# Patient Record
Sex: MALE | Race: WHITE | NOT HISPANIC OR LATINO | Employment: STUDENT | URBAN - METROPOLITAN AREA
[De-identification: names, ages, dates, MRNs, and addresses within clinical notes are randomized per-mention and may not be internally consistent; named-entity substitution may affect disease eponyms.]

---

## 2024-02-08 ENCOUNTER — HOSPITAL ENCOUNTER (OUTPATIENT)
Dept: RADIOLOGY | Facility: CLINIC | Age: 24
Discharge: HOME | End: 2024-02-08
Payer: COMMERCIAL

## 2024-02-08 DIAGNOSIS — R52 PAIN: ICD-10-CM

## 2024-02-08 PROCEDURE — 73610 X-RAY EXAM OF ANKLE: CPT | Mod: RT

## 2024-02-08 PROCEDURE — 73610 X-RAY EXAM OF ANKLE: CPT | Mod: RIGHT SIDE | Performed by: RADIOLOGY

## 2024-02-08 PROCEDURE — 73630 X-RAY EXAM OF FOOT: CPT | Mod: RT

## 2024-02-08 PROCEDURE — 73630 X-RAY EXAM OF FOOT: CPT | Mod: RIGHT SIDE | Performed by: RADIOLOGY

## 2024-02-09 ENCOUNTER — OFFICE VISIT (OUTPATIENT)
Dept: ORTHOPEDIC SURGERY | Facility: HOSPITAL | Age: 24
End: 2024-02-09
Payer: COMMERCIAL

## 2024-02-09 VITALS — BODY MASS INDEX: 28.35 KG/M2 | WEIGHT: 228 LBS | HEIGHT: 75 IN

## 2024-02-09 DIAGNOSIS — S82.839A CLOSED TRAUMATIC DISPLACED FRACTURE OF DISTAL END OF FIBULA: Primary | ICD-10-CM

## 2024-02-09 PROCEDURE — 99214 OFFICE O/P EST MOD 30 MIN: CPT | Performed by: PHYSICIAN ASSISTANT

## 2024-02-09 PROCEDURE — 1036F TOBACCO NON-USER: CPT | Performed by: PHYSICIAN ASSISTANT

## 2024-02-09 PROCEDURE — 99204 OFFICE O/P NEW MOD 45 MIN: CPT | Performed by: PHYSICIAN ASSISTANT

## 2024-02-09 ASSESSMENT — PAIN DESCRIPTION - DESCRIPTORS: DESCRIPTORS: DULL;SHARP

## 2024-02-09 ASSESSMENT — PAIN - FUNCTIONAL ASSESSMENT: PAIN_FUNCTIONAL_ASSESSMENT: 0-10

## 2024-02-09 ASSESSMENT — PAIN SCALES - GENERAL: PAINLEVEL_OUTOF10: 2

## 2024-02-09 NOTE — PROGRESS NOTES
"NPV-   History of Present Illness  23 y.o.male presents at same day walk in clinic for   1. Closed traumatic displaced fracture of distal end of fibula  Knee Walker      Mechanism of injury: hockey; ankle inversion  Date of Injury/Pain: 2/4/24  Location of pain: lateral ankle  Quality of pain:  6  Frequency of Pain: worse with walking  Associated symptoms? Swelling.  Previous treatment?  UC , xrays, splint    27 point review of systems negative except what is stated in HPI    On examination of the right ankle/foot:  NWB  Normal alignment  Mild swelling and ecchymosis of the ankle. No erythema. Skin intact; no ulcers or lesions.   Decreased ROM in  ankle plantarflexion, dorsiflexion, inversion and eversion; normal ROM in 2-5th toe flexion/extension and 1st MTP flexion/extension  Normal strength in ankle plantarflexion, dorsiflexion, inversion and eversion; normal strength with great toe flexion/extension  Tenderness to palpation: distal fibula  No tenderness to palpation over the Achilles, medial malleolus, posterior tibial tendon, peroneal tendon, ATFL, deltoid ligament, talus or navicular.  no tenderness to palpation over heel, plantar arch, base of 1st metatarsal/2nd metatarsal, sesamoids, 5th metatarsal, medial cuneiform, navicular, 1st MTP joint or 2nd or 3rd intermetarsal space.  Neurovascularly intact. Good capillary refill. No sensory deficit to light touch. Normal proprioception. Dorsalis pedis and posterior tibial pulses 2+ bilaterally.    - Rhodes's test                              -                                 - shuck testing  - Squeeze test  - \"too many toes\" sign     I personally reviewed the patient's x-ray images and reports of the right ankle/foot. The xrays show a displaced fracture of the distal fibula    ASSESSMENT: right ankle displaced distal fibula fracture    PLAN: Treatment options were discussed with the patient. Patient was placed in a well padded fiberglass cast/splint to be " nonweightbearing with crutches. They will keep the cast/splint elevated supported at the calf with NO pressure on the heel to reduce swelling. Patient will increase their dietary calcium intake (milk,yogurt) and should get 1200 mg of calcium per day. They will also take a vitamin D supplement.  All the patient's questions were answered. The patient agrees with the above plan.  Follow up with Dr. Miller

## 2024-02-09 NOTE — PATIENT INSTRUCTIONS
You were placed in a cast/splint to be nonweightbearing with your crutches. DO NOT place any weight on your cast/splint. It is important to keep your cast/splint elevated supported at the calf with NO pressure on the heel to reduce swelling.    Patient will increase their dietary calcium intake (milk,yogurt) and should get 1200 mg of calcium per day. They will also take a vitamin D supplement.    Follow up Monday with Dr. Miller

## 2024-02-12 ENCOUNTER — OFFICE VISIT (OUTPATIENT)
Dept: ORTHOPEDIC SURGERY | Facility: HOSPITAL | Age: 24
End: 2024-02-12
Payer: COMMERCIAL

## 2024-02-12 DIAGNOSIS — S82.831A TRAUMATIC CLOSED NONDISPLACED FRACTURE OF DISTAL FIBULA, RIGHT, INITIAL ENCOUNTER: Primary | ICD-10-CM

## 2024-02-12 DIAGNOSIS — M94.9 CHONDRAL LESION: ICD-10-CM

## 2024-02-12 DIAGNOSIS — S93.491A SYNDESMOTIC ANKLE SPRAIN, RIGHT, INITIAL ENCOUNTER: ICD-10-CM

## 2024-02-12 DIAGNOSIS — S82.51XA CLOSED AVULSION FRACTURE OF MEDIAL MALLEOLUS, RIGHT, INITIAL ENCOUNTER: ICD-10-CM

## 2024-02-12 PROCEDURE — 1036F TOBACCO NON-USER: CPT | Performed by: ORTHOPAEDIC SURGERY

## 2024-02-12 PROCEDURE — 99214 OFFICE O/P EST MOD 30 MIN: CPT | Performed by: ORTHOPAEDIC SURGERY

## 2024-02-12 ASSESSMENT — PAIN - FUNCTIONAL ASSESSMENT: PAIN_FUNCTIONAL_ASSESSMENT: 0-10

## 2024-02-12 ASSESSMENT — PAIN SCALES - GENERAL: PAINLEVEL_OUTOF10: 0 - NO PAIN

## 2024-02-12 NOTE — LETTER
February 12, 2024     Patient: Bon Mendoza   YOB: 2000   Date of Visit: 2/12/2024       To Whom it May Concern:    Bon Mendoza was seen in my clinic on 2/12/2024.   Dear [facility] Disability Services:  Due to Bon Mendoza's medical condition, he will require accommodations.  ICD 10 code S82.831A.  Diagnosed with physical exam and radiological studies per his healthcare team.    Accommodations:  1. Off of school from 2/27/24 to 3/6/24.  Please assist in making up his classwork and no tests or exams until after 3/16/24.  2. Requires transportation to and from classes from 2/12/24-6/15/24.  3. Requires extra time getting to and from classes from 2/12/24-6/15/24.  4. Require aisle seating if possible during classes and ability to sit during classes from 2/12/24-5/12/24.    Thank you,  Doreen Miller MD  Head Team Physician, [facility]   of Orthopedic Surgery, [facility] [employer]  [facility], [facility] [place]  [address] [place] [address], [place] [address]  [email or web address]  Office [phone]    If you have any questions or concerns, please don't hesitate to call.        Sincerely,       Doreen Miller MD    CC: No Recipients

## 2024-02-13 NOTE — PROGRESS NOTES
HISTORY OF PRESENT ILLNESS  This is a pleasant 23 y.o. year old male  who presents on 02/12/2024 for evaluation of  right ankle  pain that has been present over/since  last week .    How the condition occurred or started: He was playing hockey and was hit in center ice and hit from behind, right ankle twisted severely  Location of pain (patient points to): anterolateral lower leg and lateral ankle and less so medial ankle  Quality of pain: Moderate  Modifying Factors: not able to walk on after injury  Associated Signs and symptoms: swelling, no N/T, no other areas of injury  Previous Treatment: splinted and referred here    PHYSICAL EXAMINATION  Constitutional Exam: patient's height and weight reviewed, well-kempt  Psychiatric Exam: alert and oriented x 3, appropriate mood and behavior  Eye Exam: RAJIV, EOMI  Pulmonary Exam: breathing non-labored, no apparent distress  Lymphatic exam: no appreciable lymphadenopathy in the lower extremities  Cardiovascular exam: DP pulses 2+ bilaterally, PT 2+ bilaterally, toes are pink with good capillary refill, no pitting edema  Skin exam: no open lesions, rashes, abrasions or ulcerations  Neurological exam: sensation to light touch intact in both lower extremities in peripheral and dermatomal distributions (except for any abnormalities noted in musculoskeletal exam)    Musculoskeletal exam: right ankle: pain over distal fibula on palpation, pain over anterior deltoid area, no pain over proximal fibula, wiggles toes, no knee or hip pain, anterolateral joint line pain, effusion ankle    DATA/RESULTS REVIEW: I personally reviewed the patient's x-ray images and reports of the  right ankle shows displaced distal fibular fracture (over 3mm on lateral view seen shortening, AP view does not show displacement), subtle medial mortise widening .     ASSESSMENT: right distal fibular ramos B fracture displaced with possible chondral lesion and syndesmotic involvement, small medial malleolar  avulsion fracture   PLAN: I discussed with the patient the differential diagnosis, complex traumatic related nature of the condition(s) and available treatment option(s).  Discussed with the patient his x-rays and findings.  This type of injury does have chondral injury associated.  Also the syndesmotic ligaments may be involved due to his areas of tenderness.  Due to the displacement on lateral view which is 3 mm with shortening and malrotation with mild medial mortise widening, we would recommend surgical intervention to anatomically realign the ankle to limit risks of posttraumatic arthritis of the ankle.  Surgery would involve ankle arthroscopy of the right ankle with extensive debridement with possible microfracture if chondral full-thickness damage noted followed by open reduction internal fixation of the distal fibular fracture with possible open reduction internal fixation of the syndesmosis.  More accurate assessment of the syndesmosis can be done arthroscopically and also with stress testing with fluoroscopy.  The tiny medial malleolar avulsion injury typically does not require any fixation.  Discussed with the patient postoperative course requiring nonweightbearing in splint followed by cast followed by boot until about the 4-6-week point.  He would then start physical therapy around the third week once he gets into a boot to work on range of motion and progress through the protocol.  Early recovery is approximately 3 months and return to sports once full motion and strength and balance achieved.  Discussed with him the risks of surgery including pain, bleeding, infection, ankle stiffness, loss of ankle motion, posttraumatic arthritis, wound healing problems, bone healing problems, hardware related complications, injury nerves or vessels and other medical complications.  Discussed with him recommendation for knee scooter, shower chair versus transfer bench, cast cover and other appliances to help with his  postop recovery.  He also is a case lauded and so we wrote him a letter for disability services.      I discussed with patient the procedure in detail, risks and benefits of procedure, post-op protocol, anesthetic used with possible regional block, timeline of recovery, need for protective weightbearing and/or bracing after procedure, pain management protocol, DVT prophylaxis protocol, home preparation suggestions, pre-op surgery preparation, and other pertinent information.  The patient's questions were answered in detail and will talk with my office with respect to scheduling surgery.      Note dictated with Azubu software, completed without full type editing to avoid delay.

## 2024-02-23 ENCOUNTER — PREP FOR PROCEDURE (OUTPATIENT)
Dept: ORTHOPEDIC SURGERY | Facility: CLINIC | Age: 24
End: 2024-02-23
Payer: COMMERCIAL

## 2024-02-23 DIAGNOSIS — M94.9 CHONDRAL LESION: ICD-10-CM

## 2024-02-23 DIAGNOSIS — S93.491A SYNDESMOTIC ANKLE SPRAIN, RIGHT, INITIAL ENCOUNTER: ICD-10-CM

## 2024-02-23 DIAGNOSIS — S82.831A TRAUMATIC CLOSED NONDISPLACED FRACTURE OF DISTAL FIBULA, RIGHT, INITIAL ENCOUNTER: ICD-10-CM

## 2024-02-23 DIAGNOSIS — S82.51XA AVULSION FRACTURE OF MEDIAL MALLEOLUS, RIGHT, CLOSED, INITIAL ENCOUNTER: ICD-10-CM

## 2024-03-06 ENCOUNTER — TELEPHONE (OUTPATIENT)
Dept: ORTHOPEDIC SURGERY | Facility: CLINIC | Age: 24
End: 2024-03-06
Payer: COMMERCIAL

## 2024-03-06 ENCOUNTER — PREP FOR PROCEDURE (OUTPATIENT)
Dept: ORTHOPEDIC SURGERY | Facility: HOSPITAL | Age: 24
End: 2024-03-06
Payer: COMMERCIAL

## 2024-03-06 RX ORDER — SODIUM CHLORIDE 9 MG/ML
100 INJECTION, SOLUTION INTRAVENOUS CONTINUOUS
Status: CANCELLED | OUTPATIENT
Start: 2024-03-06

## 2024-03-06 RX ORDER — CEFAZOLIN SODIUM 2 G/100ML
2 INJECTION, SOLUTION INTRAVENOUS ONCE
Status: CANCELLED | OUTPATIENT
Start: 2024-03-06 | End: 2024-03-06

## 2024-03-06 NOTE — TELEPHONE ENCOUNTER
Patient calling because he was scheduled for ankle surgery with Dr. Miller for tomorrow however was advised by [facility] that his surgery needs to be rescheduled at least three weeks out due to patient having the flu this week.    Patient is asking for a call back to discuss new surgery dates and to answer a few questions related to medical pre-clearance since he's now had the flu, can be reached at phone # [phone].

## 2024-03-19 ENCOUNTER — HOSPITAL ENCOUNTER (OUTPATIENT)
Dept: RADIOLOGY | Facility: CLINIC | Age: 24
Setting detail: OUTPATIENT SURGERY
Discharge: HOME | End: 2024-03-19
Payer: COMMERCIAL

## 2024-03-19 ENCOUNTER — HOSPITAL ENCOUNTER (OUTPATIENT)
Facility: CLINIC | Age: 24
Setting detail: OUTPATIENT SURGERY
Discharge: HOME | End: 2024-03-19
Attending: ORTHOPAEDIC SURGERY | Admitting: ORTHOPAEDIC SURGERY
Payer: COMMERCIAL

## 2024-03-19 ENCOUNTER — ANESTHESIA EVENT (OUTPATIENT)
Dept: OPERATING ROOM | Facility: CLINIC | Age: 24
End: 2024-03-19
Payer: COMMERCIAL

## 2024-03-19 ENCOUNTER — ANESTHESIA (OUTPATIENT)
Dept: OPERATING ROOM | Facility: CLINIC | Age: 24
End: 2024-03-19
Payer: COMMERCIAL

## 2024-03-19 ENCOUNTER — PHARMACY VISIT (OUTPATIENT)
Dept: PHARMACY | Facility: CLINIC | Age: 24
End: 2024-03-19
Payer: COMMERCIAL

## 2024-03-19 VITALS
HEART RATE: 91 BPM | TEMPERATURE: 96.8 F | SYSTOLIC BLOOD PRESSURE: 126 MMHG | RESPIRATION RATE: 16 BRPM | OXYGEN SATURATION: 100 % | BODY MASS INDEX: 27.84 KG/M2 | WEIGHT: 228.62 LBS | HEIGHT: 76 IN | DIASTOLIC BLOOD PRESSURE: 75 MMHG

## 2024-03-19 DIAGNOSIS — S82.51XA AVULSION FRACTURE OF MEDIAL MALLEOLUS, RIGHT, CLOSED, INITIAL ENCOUNTER: ICD-10-CM

## 2024-03-19 DIAGNOSIS — S93.491A SYNDESMOTIC ANKLE SPRAIN, RIGHT, INITIAL ENCOUNTER: Primary | ICD-10-CM

## 2024-03-19 DIAGNOSIS — S82.831A TRAUMATIC CLOSED NONDISPLACED FRACTURE OF DISTAL FIBULA, RIGHT, INITIAL ENCOUNTER: ICD-10-CM

## 2024-03-19 DIAGNOSIS — M94.9 CHONDRAL LESION: ICD-10-CM

## 2024-03-19 PROCEDURE — A4217 STERILE WATER/SALINE, 500 ML: HCPCS | Performed by: ORTHOPAEDIC SURGERY

## 2024-03-19 PROCEDURE — 64445 NJX AA&/STRD SCIATIC NRV IMG: CPT | Performed by: STUDENT IN AN ORGANIZED HEALTH CARE EDUCATION/TRAINING PROGRAM

## 2024-03-19 PROCEDURE — 27792 TREATMENT OF ANKLE FRACTURE: CPT | Performed by: ORTHOPAEDIC SURGERY

## 2024-03-19 PROCEDURE — 2500000004 HC RX 250 GENERAL PHARMACY W/ HCPCS (ALT 636 FOR OP/ED)

## 2024-03-19 PROCEDURE — 2720000007 HC OR 272 NO HCPCS: Performed by: ORTHOPAEDIC SURGERY

## 2024-03-19 PROCEDURE — 3700000001 HC GENERAL ANESTHESIA TIME - INITIAL BASE CHARGE: Performed by: ORTHOPAEDIC SURGERY

## 2024-03-19 PROCEDURE — 29898 ANKLE ARTHROSCOPY/SURGERY: CPT | Performed by: ORTHOPAEDIC SURGERY

## 2024-03-19 PROCEDURE — 3600000002 HC OR TIME - INITIAL BASE CHARGE - PROCEDURE LEVEL TWO: Performed by: ORTHOPAEDIC SURGERY

## 2024-03-19 PROCEDURE — 3700000002 HC GENERAL ANESTHESIA TIME - EACH INCREMENTAL 1 MINUTE: Performed by: ORTHOPAEDIC SURGERY

## 2024-03-19 PROCEDURE — 7100000009 HC PHASE TWO TIME - INITIAL BASE CHARGE: Performed by: ORTHOPAEDIC SURGERY

## 2024-03-19 PROCEDURE — A29898 PR ANKLE SCOPE,EXTENS DEBRIDEMNT: Performed by: ANESTHESIOLOGY

## 2024-03-19 PROCEDURE — 2500000001 HC RX 250 WO HCPCS SELF ADMINISTERED DRUGS (ALT 637 FOR MEDICARE OP): Performed by: STUDENT IN AN ORGANIZED HEALTH CARE EDUCATION/TRAINING PROGRAM

## 2024-03-19 PROCEDURE — 2780000003 HC OR 278 NO HCPCS: Performed by: ORTHOPAEDIC SURGERY

## 2024-03-19 PROCEDURE — 2500000004 HC RX 250 GENERAL PHARMACY W/ HCPCS (ALT 636 FOR OP/ED): Performed by: STUDENT IN AN ORGANIZED HEALTH CARE EDUCATION/TRAINING PROGRAM

## 2024-03-19 PROCEDURE — 2500000004 HC RX 250 GENERAL PHARMACY W/ HCPCS (ALT 636 FOR OP/ED): Performed by: ORTHOPAEDIC SURGERY

## 2024-03-19 PROCEDURE — A29898 PR ANKLE SCOPE,EXTENS DEBRIDEMNT

## 2024-03-19 PROCEDURE — 7100000010 HC PHASE TWO TIME - EACH INCREMENTAL 1 MINUTE: Performed by: ORTHOPAEDIC SURGERY

## 2024-03-19 PROCEDURE — 2500000005 HC RX 250 GENERAL PHARMACY W/O HCPCS

## 2024-03-19 PROCEDURE — C1713 ANCHOR/SCREW BN/BN,TIS/BN: HCPCS | Performed by: ORTHOPAEDIC SURGERY

## 2024-03-19 PROCEDURE — 3600000007 HC OR TIME - EACH INCREMENTAL 1 MINUTE - PROCEDURE LEVEL TWO: Performed by: ORTHOPAEDIC SURGERY

## 2024-03-19 PROCEDURE — 7100000002 HC RECOVERY ROOM TIME - EACH INCREMENTAL 1 MINUTE: Performed by: ORTHOPAEDIC SURGERY

## 2024-03-19 PROCEDURE — 7100000001 HC RECOVERY ROOM TIME - INITIAL BASE CHARGE: Performed by: ORTHOPAEDIC SURGERY

## 2024-03-19 PROCEDURE — 27829 TREAT LOWER LEG JOINT: CPT | Performed by: ORTHOPAEDIC SURGERY

## 2024-03-19 PROCEDURE — 76000 FLUOROSCOPY <1 HR PHYS/QHP: CPT

## 2024-03-19 PROCEDURE — RXMED WILLOW AMBULATORY MEDICATION CHARGE

## 2024-03-19 DEVICE — IMPLANTABLE DEVICE: Type: IMPLANTABLE DEVICE | Site: ANKLE | Status: FUNCTIONAL

## 2024-03-19 DEVICE — K-LESS T-ROPE W/DRV, SYN REPR, SS
Type: IMPLANTABLE DEVICE | Site: ANKLE | Status: FUNCTIONAL
Brand: ARTHREX®

## 2024-03-19 DEVICE — LOCKING THIRD TUBULAR PLATE, SS, 6H
Type: IMPLANTABLE DEVICE | Site: ANKLE | Status: FUNCTIONAL
Brand: ARTHREX®

## 2024-03-19 DEVICE — LO-PRO SCRW TM,SS 3.5X 16MMCORT
Type: IMPLANTABLE DEVICE | Site: ANKLE | Status: FUNCTIONAL
Brand: ARTHREX®

## 2024-03-19 DEVICE — LO-PRO SCRW TM,SS 3.5X 22MMCORT
Type: IMPLANTABLE DEVICE | Site: ANKLE | Status: FUNCTIONAL
Brand: ARTHREX®

## 2024-03-19 RX ORDER — CELECOXIB 200 MG/1
CAPSULE ORAL AS NEEDED
Status: DISCONTINUED | OUTPATIENT
Start: 2024-03-19 | End: 2024-03-19

## 2024-03-19 RX ORDER — ASPIRIN 325 MG
325 TABLET, DELAYED RELEASE (ENTERIC COATED) ORAL DAILY
Qty: 30 TABLET | Refills: 0 | Status: SHIPPED | OUTPATIENT
Start: 2024-03-20

## 2024-03-19 RX ORDER — HYDROMORPHONE HYDROCHLORIDE 1 MG/ML
0.4 INJECTION, SOLUTION INTRAMUSCULAR; INTRAVENOUS; SUBCUTANEOUS EVERY 5 MIN PRN
Status: DISCONTINUED | OUTPATIENT
Start: 2024-03-19 | End: 2024-03-19 | Stop reason: HOSPADM

## 2024-03-19 RX ORDER — DEXAMETHASONE SODIUM PHOSPHATE 100 MG/10ML
INJECTION INTRAMUSCULAR; INTRAVENOUS AS NEEDED
Status: DISCONTINUED | OUTPATIENT
Start: 2024-03-19 | End: 2024-03-19

## 2024-03-19 RX ORDER — SODIUM CHLORIDE 0.9 G/100ML
IRRIGANT IRRIGATION AS NEEDED
Status: DISCONTINUED | OUTPATIENT
Start: 2024-03-19 | End: 2024-03-19 | Stop reason: HOSPADM

## 2024-03-19 RX ORDER — METOCLOPRAMIDE HYDROCHLORIDE 5 MG/ML
10 INJECTION INTRAMUSCULAR; INTRAVENOUS ONCE AS NEEDED
Status: DISCONTINUED | OUTPATIENT
Start: 2024-03-19 | End: 2024-03-19 | Stop reason: HOSPADM

## 2024-03-19 RX ORDER — ONDANSETRON 4 MG/1
4 TABLET, FILM COATED ORAL EVERY 8 HOURS PRN
Qty: 20 TABLET | Refills: 0 | Status: SHIPPED | OUTPATIENT
Start: 2024-03-19

## 2024-03-19 RX ORDER — LIDOCAINE IN NACL,ISO-OSMOT/PF 30 MG/3 ML
0.1 SYRINGE (ML) INJECTION ONCE
Status: DISCONTINUED | OUTPATIENT
Start: 2024-03-19 | End: 2024-03-19 | Stop reason: HOSPADM

## 2024-03-19 RX ORDER — GABAPENTIN 300 MG/1
CAPSULE ORAL AS NEEDED
Status: DISCONTINUED | OUTPATIENT
Start: 2024-03-19 | End: 2024-03-19

## 2024-03-19 RX ORDER — OXYCODONE HYDROCHLORIDE 5 MG/1
5 TABLET ORAL EVERY 4 HOURS PRN
Status: DISCONTINUED | OUTPATIENT
Start: 2024-03-19 | End: 2024-03-19 | Stop reason: HOSPADM

## 2024-03-19 RX ORDER — SODIUM CHLORIDE 9 MG/ML
100 INJECTION, SOLUTION INTRAVENOUS CONTINUOUS
Status: DISCONTINUED | OUTPATIENT
Start: 2024-03-19 | End: 2024-03-19 | Stop reason: HOSPADM

## 2024-03-19 RX ORDER — CEFAZOLIN SODIUM 2 G/100ML
2 INJECTION, SOLUTION INTRAVENOUS ONCE
Status: COMPLETED | OUTPATIENT
Start: 2024-03-19 | End: 2024-03-19

## 2024-03-19 RX ORDER — LIDOCAINE HYDROCHLORIDE 20 MG/ML
INJECTION, SOLUTION INFILTRATION; PERINEURAL AS NEEDED
Status: DISCONTINUED | OUTPATIENT
Start: 2024-03-19 | End: 2024-03-19

## 2024-03-19 RX ORDER — FENTANYL CITRATE 50 UG/ML
INJECTION, SOLUTION INTRAMUSCULAR; INTRAVENOUS AS NEEDED
Status: DISCONTINUED | OUTPATIENT
Start: 2024-03-19 | End: 2024-03-19

## 2024-03-19 RX ORDER — SODIUM CHLORIDE, SODIUM LACTATE, POTASSIUM CHLORIDE, AND CALCIUM CHLORIDE .6; .31; .03; .02 G/100ML; G/100ML; G/100ML; G/100ML
IRRIGANT IRRIGATION AS NEEDED
Status: DISCONTINUED | OUTPATIENT
Start: 2024-03-19 | End: 2024-03-19 | Stop reason: HOSPADM

## 2024-03-19 RX ORDER — DOCUSATE SODIUM 100 MG/1
100 CAPSULE, LIQUID FILLED ORAL 2 TIMES DAILY
Qty: 20 CAPSULE | Refills: 0 | Status: SHIPPED | OUTPATIENT
Start: 2024-03-19

## 2024-03-19 RX ORDER — ROPIVACAINE HYDROCHLORIDE 5 MG/ML
INJECTION, SOLUTION EPIDURAL; INFILTRATION; PERINEURAL AS NEEDED
Status: DISCONTINUED | OUTPATIENT
Start: 2024-03-19 | End: 2024-03-19

## 2024-03-19 RX ORDER — HYDROMORPHONE HYDROCHLORIDE 1 MG/ML
INJECTION, SOLUTION INTRAMUSCULAR; INTRAVENOUS; SUBCUTANEOUS AS NEEDED
Status: DISCONTINUED | OUTPATIENT
Start: 2024-03-19 | End: 2024-03-19

## 2024-03-19 RX ORDER — PROPOFOL 10 MG/ML
INJECTION, EMULSION INTRAVENOUS AS NEEDED
Status: DISCONTINUED | OUTPATIENT
Start: 2024-03-19 | End: 2024-03-19

## 2024-03-19 RX ORDER — ONDANSETRON HYDROCHLORIDE 2 MG/ML
INJECTION, SOLUTION INTRAVENOUS AS NEEDED
Status: DISCONTINUED | OUTPATIENT
Start: 2024-03-19 | End: 2024-03-19

## 2024-03-19 RX ORDER — SODIUM CHLORIDE, SODIUM LACTATE, POTASSIUM CHLORIDE, CALCIUM CHLORIDE 600; 310; 30; 20 MG/100ML; MG/100ML; MG/100ML; MG/100ML
INJECTION, SOLUTION INTRAVENOUS CONTINUOUS PRN
Status: DISCONTINUED | OUTPATIENT
Start: 2024-03-19 | End: 2024-03-19

## 2024-03-19 RX ORDER — PROPOFOL 10 MG/ML
INJECTION, EMULSION INTRAVENOUS CONTINUOUS PRN
Status: DISCONTINUED | OUTPATIENT
Start: 2024-03-19 | End: 2024-03-19

## 2024-03-19 RX ORDER — ALBUTEROL SULFATE 0.83 MG/ML
2.5 SOLUTION RESPIRATORY (INHALATION) ONCE AS NEEDED
Status: DISCONTINUED | OUTPATIENT
Start: 2024-03-19 | End: 2024-03-19 | Stop reason: HOSPADM

## 2024-03-19 RX ORDER — MIDAZOLAM HYDROCHLORIDE 1 MG/ML
INJECTION, SOLUTION INTRAMUSCULAR; INTRAVENOUS AS NEEDED
Status: DISCONTINUED | OUTPATIENT
Start: 2024-03-19 | End: 2024-03-19

## 2024-03-19 RX ORDER — ONDANSETRON HYDROCHLORIDE 2 MG/ML
4 INJECTION, SOLUTION INTRAVENOUS ONCE AS NEEDED
Status: DISCONTINUED | OUTPATIENT
Start: 2024-03-19 | End: 2024-03-19 | Stop reason: HOSPADM

## 2024-03-19 RX ORDER — GLYCOPYRROLATE 0.2 MG/ML
INJECTION INTRAMUSCULAR; INTRAVENOUS AS NEEDED
Status: DISCONTINUED | OUTPATIENT
Start: 2024-03-19 | End: 2024-03-19

## 2024-03-19 RX ORDER — OXYCODONE AND ACETAMINOPHEN 5; 325 MG/1; MG/1
1-2 TABLET ORAL EVERY 6 HOURS PRN
Qty: 40 TABLET | Refills: 0 | Status: SHIPPED | OUTPATIENT
Start: 2024-03-19 | End: 2024-03-26

## 2024-03-19 RX ORDER — METHOCARBAMOL 100 MG/ML
INJECTION, SOLUTION INTRAMUSCULAR; INTRAVENOUS AS NEEDED
Status: DISCONTINUED | OUTPATIENT
Start: 2024-03-19 | End: 2024-03-19

## 2024-03-19 RX ORDER — SODIUM CHLORIDE, SODIUM LACTATE, POTASSIUM CHLORIDE, CALCIUM CHLORIDE 600; 310; 30; 20 MG/100ML; MG/100ML; MG/100ML; MG/100ML
100 INJECTION, SOLUTION INTRAVENOUS CONTINUOUS
Status: DISCONTINUED | OUTPATIENT
Start: 2024-03-19 | End: 2024-03-19 | Stop reason: HOSPADM

## 2024-03-19 RX ORDER — ACETAMINOPHEN 325 MG/1
TABLET ORAL AS NEEDED
Status: DISCONTINUED | OUTPATIENT
Start: 2024-03-19 | End: 2024-03-19

## 2024-03-19 RX ADMIN — DEXAMETHASONE SODIUM PHOSPHATE 4 MG: 10 INJECTION INTRAMUSCULAR; INTRAVENOUS at 12:28

## 2024-03-19 RX ADMIN — PROPOFOL 50 MCG/KG/MIN: 10 INJECTION, EMULSION INTRAVENOUS at 12:30

## 2024-03-19 RX ADMIN — HYDROMORPHONE HYDROCHLORIDE 1 MG: 1 INJECTION, SOLUTION INTRAMUSCULAR; INTRAVENOUS; SUBCUTANEOUS at 13:29

## 2024-03-19 RX ADMIN — GLYCOPYRROLATE 0.1 MG: 0.2 INJECTION INTRAMUSCULAR; INTRAVENOUS at 12:40

## 2024-03-19 RX ADMIN — MIDAZOLAM 2 MG: 1 INJECTION INTRAMUSCULAR; INTRAVENOUS at 11:14

## 2024-03-19 RX ADMIN — METHOCARBAMOL 1000 MG: 100 INJECTION, SOLUTION INTRAMUSCULAR; INTRAVENOUS at 12:46

## 2024-03-19 RX ADMIN — LIDOCAINE HYDROCHLORIDE 100 MG: 20 INJECTION, SOLUTION INFILTRATION; PERINEURAL at 12:25

## 2024-03-19 RX ADMIN — CEFAZOLIN SODIUM 2 G: 2 INJECTION, SOLUTION INTRAVENOUS at 12:25

## 2024-03-19 RX ADMIN — FENTANYL CITRATE 50 MCG: 50 INJECTION, SOLUTION INTRAMUSCULAR; INTRAVENOUS at 12:25

## 2024-03-19 RX ADMIN — ONDANSETRON 4 MG: 2 INJECTION INTRAMUSCULAR; INTRAVENOUS at 15:17

## 2024-03-19 RX ADMIN — SODIUM CHLORIDE, POTASSIUM CHLORIDE, SODIUM LACTATE AND CALCIUM CHLORIDE: 600; 310; 30; 20 INJECTION, SOLUTION INTRAVENOUS at 12:19

## 2024-03-19 RX ADMIN — PROPOFOL 250 MG: 10 INJECTION, EMULSION INTRAVENOUS at 12:25

## 2024-03-19 RX ADMIN — GLYCOPYRROLATE 0.1 MG: 0.2 INJECTION INTRAMUSCULAR; INTRAVENOUS at 13:08

## 2024-03-19 RX ADMIN — CELECOXIB 200 MG: 200 CAPSULE ORAL at 11:08

## 2024-03-19 RX ADMIN — ROPIVACAINE HYDROCHLORIDE 45 ML: 5 INJECTION, SOLUTION EPIDURAL; INFILTRATION; PERINEURAL at 11:40

## 2024-03-19 RX ADMIN — FENTANYL CITRATE 50 MCG: 50 INJECTION, SOLUTION INTRAMUSCULAR; INTRAVENOUS at 11:14

## 2024-03-19 RX ADMIN — ACETAMINOPHEN 975 MG: 325 TABLET ORAL at 11:08

## 2024-03-19 RX ADMIN — GABAPENTIN 300 MG: 300 CAPSULE ORAL at 11:08

## 2024-03-19 SDOH — HEALTH STABILITY: MENTAL HEALTH: CURRENT SMOKER: 0

## 2024-03-19 ASSESSMENT — PAIN - FUNCTIONAL ASSESSMENT
PAIN_FUNCTIONAL_ASSESSMENT: 0-10

## 2024-03-19 ASSESSMENT — PAIN SCALES - GENERAL
PAINLEVEL_OUTOF10: 2
PAINLEVEL_OUTOF10: 0 - NO PAIN
PAINLEVEL_OUTOF10: 2
PAINLEVEL_OUTOF10: 0 - NO PAIN
PAINLEVEL_OUTOF10: 0 - NO PAIN

## 2024-03-19 ASSESSMENT — COLUMBIA-SUICIDE SEVERITY RATING SCALE - C-SSRS
2. HAVE YOU ACTUALLY HAD ANY THOUGHTS OF KILLING YOURSELF?: NO
6. HAVE YOU EVER DONE ANYTHING, STARTED TO DO ANYTHING, OR PREPARED TO DO ANYTHING TO END YOUR LIFE?: NO
1. IN THE PAST MONTH, HAVE YOU WISHED YOU WERE DEAD OR WISHED YOU COULD GO TO SLEEP AND NOT WAKE UP?: NO

## 2024-03-19 ASSESSMENT — ENCOUNTER SYMPTOMS: JOINT SWELLING: 1

## 2024-03-19 NOTE — ANESTHESIA PREPROCEDURE EVALUATION
Patient: Bon Mendoza    Procedure Information       Date/Time: 03/19/24 1200    Procedure: Ankle arthroscopy with extensive debridement possible drilling osteochrondral lesion of talus with biocartilage graft, ORIF fibular fracture, possible syndesmosis ORIF (Right: Ankle)    Location: [facility] OR 02 / [facility]    Surgeons: Doreen Miller MD            Relevant Problems   No relevant active problems     Past Surgical History:   Procedure Laterality Date    ANKLE FRACTURE SURGERY         Clinical information reviewed:   Tobacco  Allergies  Meds  Problems  Med Hx  Surg Hx   Fam Hx  Soc   Hx        NPO Detail:  NPO/Void Status  Date of Last Liquid: 03/18/24  Time of Last Liquid: 2100  Date of Last Solid: 03/18/24  Time of Last Solid: 2100         Physical Exam    Airway  Mallampati: I  TM distance: >3 FB  Neck ROM: full     Cardiovascular - normal exam     Dental - normal exam     Pulmonary - normal exam     Abdominal            Anesthesia Plan    History of general anesthesia?: no  History of complications of general anesthesia?: unknown/emergency    ASA 1     general and regional     The patient is not a current smoker.    intravenous induction   Postoperative administration of opioids is intended.  Trial extubation is planned.  Anesthetic plan and risks discussed with patient.    Plan discussed with attending and CAA.

## 2024-03-19 NOTE — DISCHARGE INSTRUCTIONS
Please remain nonweight bearing on your splint. DO NOT place any weight on your splint. It is important to keep your cast/splint elevated supported at the calf with NO pressure on the heel to reduce swelling.    Please take your medications as instructed    You can call [phone] to schedule your follow up appointment for around 10-14 days after surgery    If you have any concerns or questions; please call our office at [phone].   After hours or if you have questions/concerns about the block please call [phone]    You had Tylenol at 11:00, you can have it again at 5:00pm  You had Celebrex (NSAID) today, do not start taking ibuprofen/motrin until tomorrow (3/20/24)    Ok for diet    Patient Discharge Instructions for a Peripheral Nerve Block of the Leg     You have received a nerve block in your right leg  It may last up to 24 hours.     When to notify the on-call anesthesiologist:    If you have any questions or problems regarding your nerve block.    If you get a headache while sitting or standing. This may be a rare side effect of spinal or epidural anesthesia.    Go to the nearest emergency room or call 911 if you have chest pain and/ or shortness of breath that is unrelieved by sitting up. This may be a serious emergency.    If the block does not wear off in 48 hours.     Activity:    Your leg and foot will be numb and weak after surgery.    You will need to use crutches when you walk as your leg may give out.    Do not put any weight on your surgical leg for 24 hours. After 24 hours, follow the instructions given to you by your surgeon.    Avoid putting your leg on or near objects that may be very hot or cold. Your ability to feel hot and cold will be decreased until the numbing medicine wears off.     Pain Medicine:    The numbing effect of the nerve block can last from 18 to 30+ hours. Take one dose of your pain medicine the night of surgery before going to sleep, or earlier if you feel the  numbing medicine beginning to wear off.    Take your pain medicine as needed during the day and night afterwards as instructed.     Additional Instructions:    Have a responsible adult remain with you to assist you at home after surgery. Remember that you will not be able to walk without crutches or support. Work with your caregiver to learn transfer techniques.    Rest your leg elevated on pillows when possible. You may use cold packs to lessen pain and swelling. Put crushed ice in a plastic bag and wrap the bag with a towel. Place this on your incision for 10-15 minutes out of each hour. Do not sleep on the ice bag because this could cause frost bite.    Use caution when going up and down stairs.    Do not drive until you check with your surgeon.

## 2024-03-19 NOTE — H&P
"History Of Present Illness  Bon Mendoza is a 23 y.o. male presenting with right ankle fracture. Patient here for right ankle arthroscopy with possible subchondral drilling of osteochondral lesion, open distal fibula fracture ORIF and possible syndesmosis ORIF with tight rope. Patient reports he is doing well. No complaints. No hx of dvt, nonsmoker     Past Medical History  History reviewed. No pertinent past medical history.    Surgical History  Past Surgical History:   Procedure Laterality Date    ANKLE FRACTURE SURGERY          Social History  He reports that he has never smoked. He has never used smokeless tobacco. He reports current alcohol use of about 5.0 standard drinks of alcohol per week. He reports current drug use. Frequency: 2.00 times per week. Drug: Marijuana.    Family History  No family history on file.     Allergies  Patient has no known allergies.    Review of Systems   Musculoskeletal:  Positive for joint swelling.   All other systems reviewed and are negative.       Physical Exam  Vitals and nursing note reviewed. Exam conducted with a chaperone present.   Constitutional:       Appearance: Normal appearance. He is normal weight.   HENT:      Head: Normocephalic.      Nose: Nose normal.   Eyes:      Pupils: Pupils are equal, round, and reactive to light.   Pulmonary:      Effort: Pulmonary effort is normal.   Musculoskeletal:      Cervical back: Normal range of motion.   Neurological:      Mental Status: He is alert and oriented to person, place, and time.   Psychiatric:         Mood and Affect: Mood normal.         Behavior: Behavior normal.         Thought Content: Thought content normal.         Judgment: Judgment normal.          Last Recorded Vitals  Blood pressure 124/68, pulse 70, temperature 36.3 °C (97.3 °F), temperature source Temporal, resp. rate 16, height 1.93 m (6' 4\"), weight 104 kg (228 lb 9.9 oz), SpO2 99 %.    Relevant Results             Assessment/Plan   Active Problems:    " Chondral lesion    Traumatic closed nondisplaced fracture of distal fibula, right, initial encounter    Avulsion fracture of medial malleolus, right, closed, initial encounter    Syndesmotic ankle sprain, right, initial encounter             I spent 12 minutes in the professional and overall care of this patient.      Kenny Schafer PA-C

## 2024-03-19 NOTE — ANESTHESIA PROCEDURE NOTES
Peripheral Block    Patient location during procedure: pre-op  Start time: 3/19/2024 11:13 AM  End time: 3/19/2024 11:40 AM  Reason for block: post-op pain management  Staffing  Performed: resident   Authorized by: Vinita Wheat DO    Performed by: Vinita Wheat DO  Preanesthetic Checklist  Completed: patient identified, IV checked, site marked, risks and benefits discussed, surgical consent, monitors and equipment checked, pre-op evaluation and timeout performed   Timeout performed at: 3/19/2024 11:13 AM  Peripheral Block  Patient position: laying flat  Prep: ChloraPrep  Patient monitoring: heart rate and continuous pulse ox  Block type: popliteal and femoral  Laterality: right  Injection technique: single-shot  Guidance: ultrasound guided  Local infiltration: lidocaine  Infiltration strength: 0.5 %  Dose: 45 mL  Needle  Needle type: Tuohy   Needle gauge: 26 G  Needle length: 8 cm  Needle localization: ultrasound guidance     image stored in chart  Assessment  Injection assessment: negative aspiration for heme, no paresthesia on injection, incremental injection and local visualized surrounding nerve on ultrasound  Additional Notes  Popliteal nerve block: Informed consent obtained.  Risks, benefits, and alternatives discussed.  ASA monitors placed and timeout performed.  Patient positioned, prepped with chlorhexidine, and draped with sterile towels.  Ultrasound guidance was used to visualize the tibia and common peroneal nerves at the popliteal fossa and surrounding structures with visualization of the needle throughout duration of the procedure.  Aspiration was negative.  10cc of lidocaine 2% with 50mcg epinephrine and 15 cc of 0.5% ropivacaine and 1:200,000 epinephrine injected.      Ultrasound guidance was used to visualize the femoral nerve at the femoral crease canal and femoral artery/vein with visualization of the needle throughout duration of the procedure.  Aspiration was negative.  20 cc of 0.5%  ropivacaine, dexamethasone 4 mg, and 1:200,000 epinephrine injected.  Patient tolerated the procedure well.

## 2024-03-19 NOTE — BRIEF OP NOTE
Date: 3/19/2024  OR Location: [facility]    Name: Bon Mendoza, : 2000, Age: 23 y.o., MRN: [id number], Sex: male    Diagnosis  Pre-op Diagnosis     * Chondral lesion [M94.9]     * Traumatic closed nondisplaced fracture of distal fibula, right, initial encounter [S82.831A]     * Avulsion fracture of medial malleolus, right, closed, initial encounter [S82.51XA]     * Syndesmotic ankle sprain, right, initial encounter [S93.491A] Post-op Diagnosis     * Chondral lesion [M94.9]     * Traumatic closed nondisplaced fracture of distal fibula, right, initial encounter [S82.831A]     * Avulsion fracture of medial malleolus, right, closed, initial encounter [S82.51XA]     * Syndesmotic ankle sprain, right, initial encounter [S93.491A]     Procedures  Ankle arthroscopy, ORIF fibular fracture, syndesmosis ORIF  77929 - MT ARTHROSCOPY ANKLE SURGICAL DEBRIDEMENT EXTENSIVE  - MT ORIF FIBULAR FRACTURE DISTAL  - MT ORIF SYNDESMOSIS    Surgeons      * Doreen Miller - Primary    Resident/Fellow/Other Assistant:  Surgeon(s) and Role:     * Juan Jose Gonzalez MD - Resident - Assisting    Procedure Summary  Anesthesia: Regional  ASA: I  Anesthesia Staff: Anesthesiologist: Christiano Andrade MD  C-AA: JEANMARIE Landa; JEANMARIE Eubanks  Estimated Blood Loss: 15mL  Intra-op Medications:   Administrations occurring from 1200 to 1515 on 24:   Medication Name Total Dose   lactated Ringer's irrigation solution 1,000 mL   sodium chloride 0.9 % irrigation solution 500 mL   ceFAZolin in dextrose (iso-os) (Ancef) IVPB 2 g 2 g              Anesthesia Record               Intraprocedure I/O Totals          Intake    Propofol Drip 89.18 mL    The total shown is the total volume documented since Anesthesia Start was filed.    LR infusion 700.00 mL    ceFAZolin in dextrose (iso-os) (Ancef) IVPB 2 g 100.00 mL    Total Intake 889.18 mL       Output    Est. Blood Loss 5 mL    Total Output 5 mL       Net    Net Volume 884.18 mL           Specimen: No specimens collected     Staff:   Circulator: Brittany Doll RN; Macho Parker RN  Relief Circulator: Suzanne Deshpande RN  Scrub Person: Leilani Gallo    Findings: Consistent with preop diagnosis    Complications:  None; patient tolerated the procedure well.     Disposition: PACU - hemodynamically stable.  Condition: stable  Specimens Collected: No specimens collected  Attending Attestation: I was present and scrubbed for the entire procedure.    Doreen Miller  Phone Number: [phone]

## 2024-03-19 NOTE — ANESTHESIA PROCEDURE NOTES
Airway  Date/Time: 3/19/2024 12:26 PM  Urgency: elective    Airway not difficult    Staffing  Performed: JEANMARIE   Authorized by: Christiano Andrade MD    Performed by: JEANMARIE Landa  Patient location during procedure: OR    Indications and Patient Condition  Indications for airway management: anesthesia  Spontaneous Ventilation: absent  Sedation level: deep  Preoxygenated: yes  Patient position: sniffing  Mask difficulty assessment: 1 - vent by mask  Planned trial extubation    Final Airway Details  Final airway type: supraglottic airway      Successful airway: Size 5     Number of attempts at approach: 1    Additional Comments  Lips/teeth in preanesthetic condition. LMA IGEL 5.

## 2024-03-20 ASSESSMENT — PAIN SCALES - GENERAL: PAINLEVEL_OUTOF10: 6

## 2024-03-20 NOTE — ANESTHESIA POSTPROCEDURE EVALUATION
Patient: Bon Mendoza    Procedure Summary       Date: 03/19/24 Room / [place] [facility] / [facility]    Anesthesia Start: 1219 Anesthesia Stop: 1556    Procedure: Ankle arthroscopy, ORIF fibular fracture, syndesmosis ORIF (Right: Ankle) Diagnosis:       Chondral lesion      Traumatic closed nondisplaced fracture of distal fibula, right, initial encounter      Avulsion fracture of medial malleolus, right, closed, initial encounter      Syndesmotic ankle sprain, right, initial encounter      (Chondral lesion [M94.9])      (Traumatic closed nondisplaced fracture of distal fibula, right, initial encounter [S82.831A])      (Avulsion fracture of medial malleolus, right, closed, initial encounter [S82.51XA])      (Syndesmotic ankle sprain, right, initial encounter [S93.491A])    Surgeons: Doreen Miller MD Responsible Provider: Christiano Andrade MD    Anesthesia Type: general ASA Status: 1            Anesthesia Type: general    Vitals Value Taken Time   /56 03/19/24 1622   Temp 36.1 °C (97 °F) 03/19/24 1622   Pulse 86 03/19/24 1622   Resp 16 03/19/24 1622   SpO2 100 % 03/19/24 1622       Anesthesia Post Evaluation    Patient location during evaluation: bedside  Patient participation: complete - patient participated  Level of consciousness: awake and awake and alert  Pain management: satisfactory to patient  Airway patency: patent  Cardiovascular status: acceptable and stable  Respiratory status: acceptable  Hydration status: balanced  Postoperative Nausea and Vomiting: none  Comments: Did very well    No notable events documented.

## 2024-03-20 NOTE — OP NOTE
Ankle arthroscopy, ORIF fibular fracture, syndesmosis ORIF (R) Operative Note     Date: 3/19/2024  OR Location: [facility] OR    Name: Bon Mendoza : 2000, Age: 23 y.o., MRN: [id number], Sex: male    Diagnosis  Pre-op Diagnosis     * Chondral lesion [M94.9]     * Traumatic closed nondisplaced fracture of distal fibula, right, initial encounter [S82.831A]     * Avulsion fracture of medial malleolus, right, closed, initial encounter [S82.51XA]     * Syndesmotic ankle sprain, right, initial encounter [S93.491A] Post-op Diagnosis     * Chondral lesion [M94.9]     * Traumatic closed nondisplaced fracture of distal fibula, right, initial encounter [S82.831A]     * Avulsion fracture of medial malleolus, right, closed, initial encounter [S82.51XA]     * Syndesmotic ankle sprain, right, initial encounter [S93.491A]     Procedures  Ankle arthroscopy, ORIF fibular fracture, syndesmosis ORIF  24458 - WA ARTHROSCOPY ANKLE SURGICAL DEBRIDEMENT EXTENSIVE  - WA ORIF DISTAL FIBULAR FRACTURE  - WA ORIF SYNDESMOSIS    Surgeons      * Doreen Miller - Primary    Resident/Fellow/Other Assistant:  Surgeon(s) and Role:     * Juan Jose Gonzalez MD - Resident - Assisting    Procedure Summary  Anesthesia: Regional  ASA: I  Anesthesia Staff: Anesthesiologist: Christiano Andrade MD  C-AA: JEANMARIE Landa; JEANMARIE Eubanks  Estimated Blood Loss: less 5mL  Intra-op Medications:   Administrations occurring from 1200 to 1515 on 24:   Medication Name Total Dose   lactated Ringer's irrigation solution 1,000 mL   sodium chloride 0.9 % irrigation solution 500 mL   ceFAZolin in dextrose (iso-os) (Ancef) IVPB 2 g 2 g              Anesthesia Record               Intraprocedure I/O Totals          Intake    Propofol Drip 89.18 mL    The total shown is the total volume documented since Anesthesia Start was filed.    LR infusion 700.00 mL    ceFAZolin in dextrose (iso-os) (Ancef) IVPB 2 g 100.00 mL    Total Intake 889.18 mL        Output    Est. Blood Loss 5 mL    Total Output 5 mL       Net    Net Volume 884.18 mL          Specimen: No specimens collected     Staff:   Circulator: Brittany Doll RN; Macho Parker RN  Relief Circulator: Suzanne Deshpande RN  Scrub Person: Leilani Gallo         Drains and/or Catheters: * None in log *    Tourniquet Times:     Total Tourniquet Time Documented:  Thigh (Right) - 150 minutes  Total: Thigh (Right) - 150 minutes      Implants:  Implants       Type Name Action Serial No.      Screw SCREW, LOW PROFILE, CORTICAL, 3.5 X 22MM, SS - [id number] Implanted      Screw PLATE, LOCKING, 6H, THIRD TUBULAR, SS - [id number] Implanted      Screw SCREW, LOW PROFILE, LOCKING, 2.7MM X 20MM, SS - [id number] Implanted      Screw Low Prole Nonlocking Screw, SS, 2.7 x 30 mm, Cortical Implanted      Screw KreuLock Compression Screw, SS, 3.5 mm x 18 mm Implanted      Screw SCREW, LOW PROFILE, CORTICAL, 3.5 X 16M, SS - [id number] Implanted      Screw Low Prole Nonlocking Screw, SS, 3.5 x 16 mm, Cortical Implanted      Screw Low Prole Nonlocking Screw, SS, 3.5 x 14 mm, Cortical Implanted      Implant KIT, REPAIR, TIGHTROPE XP, SYNEDESMOSIS - [id number] Implanted             Indications: Bon Mendoza is an 23 y.o. male who is having surgery for Chondral lesion [M94.9]  Traumatic closed nondisplaced fracture of distal fibula, right, initial encounter [S82.831A]  Avulsion fracture of medial malleolus, right, closed, initial encounter [S82.51XA]  Syndesmotic ankle sprain, right, initial encounter [S93.491A].   After discussing the alternatives of treatment in detail with the patient, the patient selected surgical intervention and/or reconstruction.  We discussed with the patient risks and benefits of the procedure(s).  Risks of surgery were reviewed including pain, bleeding, infection, wound healing problems, soft tissue or bone healing problems, injury to nerves or vessels, implant or hardware related complications, chronic extremity  stiffness or pain or swelling, post-traumatic arthritis, recurrent symptoms, DVT, PE and other medical complications.  After the patient´s questions were answered in detail including post-operative course requiring [ ] and the extensive rehabilitation needed after surgery, the patient then gave informed consent for the procedure.    DESCRIPTION OF PROCEDURE  The patient was identified in the pre-operative area and the surgical extremity was marked with a skin marker to designate surgical site.  Anesthesia consult placed popliteal block without complication.  Patient then was brought back to the operating room.  A huddle was called and confirmed upon entry into the operating room as per protocol.  Time out was called and confirmed prior to skin incision.  General anesthetic was administered and LMA placed without complication.  Patient received IV Ancef prior to skin incision as per protocol.  DVT prophylaxis was performed using stocking and SCD application on well leg.  A well-padded tourniquet was placed on the right upper thigh and was elevated to 280mmHg.  The patient then was carefully positioned on the beanbag with bump underneath the ipsilateral hip to help with ankle access during the case.  All superficial nerve areas and bony prominences were well-padded and protected during the case and neutral spinal alignment maintained.  The operative leg was placed in a well-padded Ferkel leg gibson to protect the neurovascular structures.  We then proceeded to prep and drape in the usual standard sterile fashion.  The sterile ankle stirrup and distractor system was gently applied to the operative ankle.  Anatomical landmarks of the right ankle were then identified and anteromedial portal localized with an 18 gauge spinal needle.  A small nick in the skin was made with an 11 blade and portal was then made with a mosquito hemostat.  We then introduced the 2.7mm 30 degree arthroscope into the ankle joint.  Looking  anterolaterally, we then localized our anterolateral portal under direct visualization with an 18 gauge spinal needle.  Angelo and spread technique was then used to create the anterolateral portal.  We then performed diagnostic arthroscopy which revealed avulsion of the posterior inferior tibiofibular ligament with capsular labral delamination posterior lateral tibia, mild chondromalacia grade 2 anterior lateral talus, mild chondromalacia grade 2 at the junction between the medial malleolus and the tibial plafond and, AITFL injury, significant hypertrophic plical folds medial gutter and lateral gutter and anterior compartment of the ankle.  We then proceeded with extensive debridement of the right ankle including the medial lateral gutters and anterior compartment of the ankle and posterior compartment and syndesmotic meniscoid lesion.  Examination with arthroscope in place and ankle including dorsiflexion eversion stress testing did show instability of the syndesmosis particularly posteriorly.  Once the arthroscopy was completed, the operative ankle was carefully taken out of the ankle stirrup and leg gibson removed.  Portals then were closed with 4-0 nylon sutures.  We then proceeded with the open portion of the case.  We utilized a curvilinear incision based over the distal fibula with 15 blade.  We then used bipolar cautery for hemostasis.  We then utilized a Morris blade going down to bone and elevating subperiosteal flaps anteriorly and posteriorly to expose the fracture site.  Distal fibular fracture was shortened and malrotated.  We then utilized a Morris blade to remove all the debris and scar tissue in the fracture site so that it could be mobilized and reduced.  We then utilized bone reduction tenaculums to reduce the distal fibular fracture.  Large C-arm fluoroscopy was utilized to confirm appropriate reduction.  We did place one 3.5 mm cortical lag screw however bone purchase in posterior fibula in that  area was not as good as expected due to comminution and so we used two, 2.7 mm lag screws proximal and distal portions of the fracture where the bone quality was better and had excellent fixation and compression across the fracture site.  We then placed nonlocking screw proximally through the plate using standard technique.  We then placed locking screws proximally and distally 3.5 mm diameter.  The available screw hole was utilized for syndesmotic fixation.  We did do fluoroscopic stress testing which did show instability of the fibula particularly on lateral view.  We then proceeded with the open reduction internal position of the syndesmosis.  We utilized a large bone reduction tenaculum and made a small incision medially to accommodate the clamp.  Ankle was dorsiflexed and clamp was placed appropriately to reduce the syndesmosis.  We then proceeded with drill guide and soft tissue protector and advanced the K wire across the fibula and across the tibia to exit medially carefully.  We confirmed good position of the guidewire on AP lateral and oblique x-rays.  We then overdrilled with fluoroscopy confirmation of good position.  We then removed the drill and guidepin.  We then proceeded with placement of the tight rope and flipped the Endobutton to sit flush on the medial cortex of the tibia.  We made sure the button was sitting right on the tibia and neurovascular on the was out of harm's way.  We then dorsiflex the ankle as we tightened down the lateral button to sit into the plate.  Once appropriately tensioned and tightened we then remove the bone reduction tenaculum and did final seating.  Syndesmosis now was stable on stress test fluoroscopy.  Alignment of the ankle anatomical.  All screws proper length and position.  Hardware appropriate.  We then proceeded with copious irrigation.  We closed the deep periosteal layer with interrupted 2-0 Vicryl mattress sutures.  We then irrigated again and then closed the  subdermal tissue with interrupted 4-0 Vicryl sutures.  Finally the skin was closed with 4 nylon.  Tourniquet was released and all toes were pink and warm with distal pulses intact.  Dressing including xeroform, fluffs, ABD´s, soft roll was applied.  Patient was placed in a well-padded short leg splint sugar-tong and posterior type and dressing completed with ace wraps.      The patient was transported to the PACU in stable condition.  Patient will be non-weightbearing on their operative ankle with crutches, knee walker or wheelchair.  Patient fulfilled post-procedure discharge criteria and was released home.  Patient and patient representatives were given detailed discharge instructions and home-going medications including Percocet  for severe pain only, Zofran, Senna and DVT prophylaxis with enteric-coated aspirin to be started on postop day 1.  We discussed with the patient the different medications available for DVT prophylaxis and after discussion of risks and benefits the patient selected the above.  Splint care reviewed and splint will be kept in place until follow-up in the office in 10-14 days.  Findings were discussed with the patient and their representative after the procedure and questions were answered in detail.      Complications:  None; patient tolerated the procedure well.    Disposition: PACU - hemodynamically stable.  Condition: stable   Attending Attestation: I was present and scrubbed for the entire procedure.    Doreen Miller  Phone Number: [phone]

## 2024-03-29 ENCOUNTER — APPOINTMENT (OUTPATIENT)
Dept: ORTHOPEDIC SURGERY | Facility: CLINIC | Age: 24
End: 2024-03-29
Payer: COMMERCIAL

## 2024-03-29 ENCOUNTER — OFFICE VISIT (OUTPATIENT)
Dept: ORTHOPEDIC SURGERY | Facility: HOSPITAL | Age: 24
End: 2024-03-29
Payer: COMMERCIAL

## 2024-03-29 VITALS — HEIGHT: 75 IN | WEIGHT: 230 LBS | BODY MASS INDEX: 28.6 KG/M2

## 2024-03-29 DIAGNOSIS — S82.831A TRAUMATIC CLOSED NONDISPLACED FRACTURE OF DISTAL FIBULA, RIGHT, INITIAL ENCOUNTER: ICD-10-CM

## 2024-03-29 DIAGNOSIS — S93.491A SYNDESMOTIC ANKLE SPRAIN, RIGHT, INITIAL ENCOUNTER: Primary | ICD-10-CM

## 2024-03-29 PROCEDURE — 99024 POSTOP FOLLOW-UP VISIT: CPT | Performed by: PHYSICIAN ASSISTANT

## 2024-03-29 ASSESSMENT — PAIN - FUNCTIONAL ASSESSMENT: PAIN_FUNCTIONAL_ASSESSMENT: 0-10

## 2024-03-29 ASSESSMENT — PAIN SCALES - GENERAL: PAINLEVEL_OUTOF10: 0 - NO PAIN

## 2024-03-29 NOTE — PROGRESS NOTES
NPV-   History of Present Illness  23 y.o.male here for pov s/p right ankle arthroscopy with extensive debridement and open distal fibula fracture and syndesmosis ORIF on 3/19/24  1. Syndesmotic ankle sprain, right, initial encounter        2. Traumatic closed nondisplaced fracture of distal fibula, right, initial encounter          . Patient reports they are doing well. No pain.  NWB on splint. Taking asa      On examination of right ankle, incision is clean/dry/intact.  Sutures are intact.  No bleeding or drainage. Healing well.  Minimal swelling. Improved ROM and strength.  Neurovascularly intact.  Normal sensation to light touch.  Dorsalis pedis and posterior tibial pulses 2+ bilaterally. wiggles toes, calf soft and nontender    A/P: s/p right ankle arthroscopy with extensive debridement and open distal fibula fracture and syndesmosis ORIF on 3/19/24  - patient is doing well  -  splint removed and incision cleaned and dressed  - Patient was placed in a well padded fiberglass cast/splint to be nonweightbearing with crutches. They will keep the cast/splint elevated supported at the calf with NO pressure on the heel to reduce swelling.  - Continue your aspirin daily with food for blood clot prevention  - Patient will increase their dietary calcium intake (milk,yogurt) and should get 1200 mg of calcium per day. They will also take a vitamin D supplement.  - All the patient's questions were answered. The patient agrees with the above plan.  Follow up in 2 weeks for suture removal

## 2024-04-15 ENCOUNTER — OFFICE VISIT (OUTPATIENT)
Dept: ORTHOPEDIC SURGERY | Facility: HOSPITAL | Age: 24
End: 2024-04-15
Payer: COMMERCIAL

## 2024-04-15 DIAGNOSIS — S82.51XA CLOSED AVULSION FRACTURE OF MEDIAL MALLEOLUS, RIGHT, INITIAL ENCOUNTER: ICD-10-CM

## 2024-04-15 DIAGNOSIS — S93.491A SYNDESMOTIC ANKLE SPRAIN, RIGHT, INITIAL ENCOUNTER: ICD-10-CM

## 2024-04-15 DIAGNOSIS — S82.831A TRAUMATIC CLOSED NONDISPLACED FRACTURE OF DISTAL FIBULA, RIGHT, INITIAL ENCOUNTER: ICD-10-CM

## 2024-04-15 DIAGNOSIS — S93.491A SYNDESMOTIC ANKLE SPRAIN, RIGHT, INITIAL ENCOUNTER: Primary | ICD-10-CM

## 2024-04-15 PROCEDURE — 99024 POSTOP FOLLOW-UP VISIT: CPT | Performed by: ORTHOPAEDIC SURGERY

## 2024-04-15 PROCEDURE — L4361 PNEUMA/VAC WALK BOOT PRE OTS: HCPCS | Performed by: PHYSICIAN ASSISTANT

## 2024-04-15 NOTE — PROGRESS NOTES
Patient comes in s/p right ankle ORIF.  He has been NWB.  He is taking ASA per day.        Physical Exam:  Right ankle  : incisions healed and sutures removed with steristrips applied, calf soft and supple, toes pink and warm with good capillary refill, pulses intact, limb swelling appropriate, wiggles toes    Assessment: s/p right ankle scope with extensive debridement, ORIF distal fibula, ORIF syndesmosis 3/19/24  Plan:   - Weightbearing instructions and restrictions discussed with patient, placed in tall cam walker boot  - DVT prophylaxis continue with ASA  - follow-up visit 3 weeks, xrays AP/lat/obl right ankle nonweightbearing at next visit  - PT orders placed, instructed patient to call ahead to schedule appointment  Patient's questions were answered in detail and they are agreeable to above plan.

## 2024-04-24 ENCOUNTER — EVALUATION (OUTPATIENT)
Dept: PHYSICAL THERAPY | Facility: HOSPITAL | Age: 24
End: 2024-04-24
Payer: COMMERCIAL

## 2024-04-24 DIAGNOSIS — S82.51XA CLOSED AVULSION FRACTURE OF MEDIAL MALLEOLUS, RIGHT, INITIAL ENCOUNTER: ICD-10-CM

## 2024-04-24 DIAGNOSIS — S93.491A SYNDESMOTIC ANKLE SPRAIN, RIGHT, INITIAL ENCOUNTER: ICD-10-CM

## 2024-04-24 DIAGNOSIS — M25.471 EDEMA OF RIGHT ANKLE: ICD-10-CM

## 2024-04-24 DIAGNOSIS — S82.831A TRAUMATIC CLOSED NONDISPLACED FRACTURE OF DISTAL FIBULA, RIGHT, INITIAL ENCOUNTER: ICD-10-CM

## 2024-04-24 DIAGNOSIS — R26.89 ANTALGIC GAIT: ICD-10-CM

## 2024-04-24 DIAGNOSIS — M25.571 RIGHT ANKLE PAIN: Primary | ICD-10-CM

## 2024-04-24 PROCEDURE — 97161 PT EVAL LOW COMPLEX 20 MIN: CPT | Mod: GP

## 2024-04-24 PROCEDURE — 97110 THERAPEUTIC EXERCISES: CPT | Mod: GP

## 2024-04-24 ASSESSMENT — ENCOUNTER SYMPTOMS
OCCASIONAL FEELINGS OF UNSTEADINESS: 1
DEPRESSION: 0
LOSS OF SENSATION IN FEET: 0

## 2024-04-24 NOTE — PROGRESS NOTES
Physical Therapy Initial Evaluation    Patient Name:Bon Mendoza  MRN:[id number]  Today's Date:4/24/2024  Referred by: Doreen Miller  Time Calculation  Start Time: 1520  Stop Time: 1612  Time Calculation (min): 52 min    Therapy Diagnosis  1. Right ankle pain    2. Syndesmotic ankle sprain, right, initial encounter    3. Traumatic closed nondisplaced fracture of distal fibula, right, initial encounter    4. Closed avulsion fracture of medial malleolus, right, initial encounter    5. Edema of right ankle    6. Antalgic gait         Plan of Care  Goals: Goals set and discussed today.   Lower Extremity Goals: By discharge, patient will:  Demonstrate independence with home exercise program  Tolerate increased exercise without adverse reaction  Increase ROM of right ankle PF to 45 deg and DF to 5 deg to improve the ability to perform essential ADLs  Increase strength of right ankle PF/DF to 4+/5 MMT to improve the ability to perform essential ADLs  Report decrease in pain by >= 2 points to meet MCID  Increase score of LEFS by > 9 points to meet the MCID  Ambulate during therapy session without an increase in symptoms  Patient stated goal: rehab the ankle for active use    Planned interventions include prn: cryotherapy, edema control, electrical stimulation, gait training, home exercise program, kinesiotaping, manual therapy, therapeutic exercise, vasopneumatic device with cold  Frequency and duration: 1-2 time(s) a week, or up to 20 visits .   Plan of care was developed with input and agreement by the patient.     Assessment  Problem List: activity limitations, ADLs/IADLs/self care skills, balance, decreased functional level, decreased knowledge of assisted device use, decreased knowledge of HEP, edema, fall risk, flexibility, gait/locomotion, pain, posture, range of motion/joint mobility and strength.     Patient is a  23 y.o. male who presents with complaint of s/p ORIF distal fibula and syndesm,osis with  extensive debridement 3/19/24 . Standardized testing and measures administered today reveal that the patient has multiple impairments in body structures and functions, activity limitations, and participation restrictions. These include subjective and objective findings such as pain, tenderness to palpation of the affected area, decreased ROM, strength, flexibility, and function. The patient's impairments are consistent with his post op condition. Skilled PT services are warranted in order to realize measurable and meaningful change in the above outcome measures and achieve improvements in the patient’s functional status and individual goals.  Rehab Potential:good  Clinical Presentation: Stable and/or uncomplicated characteristics.   Evaluation Complexity: Low    Precautions/Fall Risk: weight bearing status: non weight bearing for 2 more weeks until he sees the MD and will possibly progress weight bearing at that visit Pacemaker no  Seizures No  Post Op Movement/Restrictions YesNWB left LE    Insurance  Visit number: 1   Approved number of visits: MN  Onset Date: 2/4/2024  Payor: [employer] / Plan: [employer]AN / Product Type: *No Product type* /     Subjective  Chief complaint/reason for visit: s/p ORIF distal fibula and syndesmosis, extensive debridement 3/19/24 (5 screws, plate, wire per patient)  Mechanism of Injury:  hockey - patient was knocked over and fell on it over the weekend (patient fell in the bathroom and the mat went under him, injured his toe and had increased pain, some bruising on a toe, ankle was protected, took Advil  Location of Pain: lateral left ankle  Current Pain Level (0-10): 0   High Pain Level (0-10): 5  (toes, more like 2-3 now) Low Pain Level (0-10): 0 (some ankle pain if standing for awhile)  Pain Quality: aching and dull  Pain Exacerbating Factors: standing, walking  Pain Relieving Factors: medications nonsteroidal anti-inflammatory drugs    Medical Screening: Reviewed medical history  form with patient and medical screening assessed.   Red Flags: Do you have any of the following? No  Fever/chills, unexplained weight changes, dizziness/fainting, unexplained change in bowel or bladder functions, unexplained malaise or muscle weakness, night pain/sweats, numbness or tingling  Current Medical Management: cast x about 2 weeks then boot (current); crutches  Prior Level of Function (PLOF)  Patient previously independent with all ADLs  Exercise/Physical Activity: hockey, exercised about 5x a week - regular workout at gym  Functional limitations: athletics, standing , walking , participation in leisure activities , participation in home management    Work Status: student full time; 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   Living Environment: apartment  Social Support: significant other  Patient's Goal for Treatment:  relieving pain , increasing strength , increasing mobility , walking with a normal gait , reducing symptoms , returning to work  and resuming athletics/activities .     Objective  Other Measures  Lower Extremity Funtional Score (LEFS): 36   R/L ankle PF AROM (degrees): 40/60  R/L ankle DF AROM (degrees): neutral/10  R/L ankle INV AROM (degrees): 5/20  R/L ankle EV AROM (degrees): 5/10  R/L knee flexion AROM (degrees): 140 bilateral  R/L knee extension AROM (degrees): 0 bilateral  R/L ankle PF strength (MMT): NT/5/5  R/L ankle DF strength (MMT): NT/5/5  R/L ankle INV strength (MMT): NT, 5/5  R/L ankle EV strength (MMT): NT, 5/5  R/L knee flexion strength (MMT): 5/5 bilateral  R/L knee extension strength (MMT): 5/5 bilateral  Palpation: no tenderness to palpation right ankle  Gait: NWB, crutches   Mid malleoli girth: 29 cm right, 27 cm left    Treatment Performed Today: Initial evaluation and patient education regarding diagnosis, prognosis, contributing factors, importance of  HEP, and role of PT  Evaluation PT Evaluation Time Entry  PT Evaluation (Low) Time Entry: 40  Therapeutic Procedure PT Therapeutic Procedures Time Entry  Therapeutic Exercise Time Entry: 10  Response to Treatment:improved knowledge and understanding of condition    Education/Resources provided today: Home Program   [employer]bridge: ankle pump, towel calf stretch, towel foot scrunch

## 2024-04-29 ENCOUNTER — APPOINTMENT (OUTPATIENT)
Dept: ORTHOPEDIC SURGERY | Facility: HOSPITAL | Age: 24
End: 2024-04-29
Payer: COMMERCIAL

## 2024-05-02 ENCOUNTER — DOCUMENTATION (OUTPATIENT)
Dept: PHYSICAL THERAPY | Facility: HOSPITAL | Age: 24
End: 2024-05-02
Payer: COMMERCIAL

## 2024-05-02 NOTE — PROGRESS NOTES
Physical Therapy                 Therapy Communication Note    Patient Name: Bon Mendoza  MRN: [id number]  Today's Date: 5/2/2024     Discipline: Physical Therapy    Missed Time: No Show

## 2024-05-06 ENCOUNTER — OFFICE VISIT (OUTPATIENT)
Dept: ORTHOPEDIC SURGERY | Facility: HOSPITAL | Age: 24
End: 2024-05-06
Payer: COMMERCIAL

## 2024-05-06 ENCOUNTER — TREATMENT (OUTPATIENT)
Dept: PHYSICAL THERAPY | Facility: HOSPITAL | Age: 24
End: 2024-05-06
Payer: COMMERCIAL

## 2024-05-06 ENCOUNTER — HOSPITAL ENCOUNTER (OUTPATIENT)
Dept: RADIOLOGY | Facility: HOSPITAL | Age: 24
Discharge: HOME | End: 2024-05-06
Payer: COMMERCIAL

## 2024-05-06 DIAGNOSIS — S82.51XA CLOSED AVULSION FRACTURE OF MEDIAL MALLEOLUS, RIGHT, INITIAL ENCOUNTER: ICD-10-CM

## 2024-05-06 DIAGNOSIS — R26.89 ANTALGIC GAIT: ICD-10-CM

## 2024-05-06 DIAGNOSIS — M25.471 EDEMA OF RIGHT ANKLE: ICD-10-CM

## 2024-05-06 DIAGNOSIS — S82.831A TRAUMATIC CLOSED NONDISPLACED FRACTURE OF DISTAL FIBULA, RIGHT, INITIAL ENCOUNTER: ICD-10-CM

## 2024-05-06 DIAGNOSIS — M25.571 RIGHT ANKLE PAIN: ICD-10-CM

## 2024-05-06 DIAGNOSIS — S93.491A SYNDESMOTIC ANKLE SPRAIN, RIGHT, INITIAL ENCOUNTER: ICD-10-CM

## 2024-05-06 PROCEDURE — 99024 POSTOP FOLLOW-UP VISIT: CPT | Performed by: ORTHOPAEDIC SURGERY

## 2024-05-06 PROCEDURE — 73610 X-RAY EXAM OF ANKLE: CPT | Mod: RT

## 2024-05-06 PROCEDURE — 97110 THERAPEUTIC EXERCISES: CPT | Mod: GP

## 2024-05-06 PROCEDURE — 73610 X-RAY EXAM OF ANKLE: CPT | Mod: RIGHT SIDE | Performed by: RADIOLOGY

## 2024-05-06 NOTE — PROGRESS NOTES
Physical Therapy Treatment    Patient Name:Bon Mendoza  MRN:[id number]  Today's Date:5/6/2024  Referred by: Doreen Miller  Time Calculation  Start Time: 1205  Stop Time: 1254  Time Calculation (min): 49 min    Therapy Diagnosis  1. Traumatic closed nondisplaced fracture of distal fibula, right, initial encounter    2. Closed avulsion fracture of medial malleolus, right, initial encounter    3. Right ankle pain    4. Edema of right ankle    5. Antalgic gait         Assessment: Patient progressing well toward goals - no c/o increased soreness - ROM gradually improving    Plan: Patient will gradually increase weight bearing with boot and crutches    Insurance  Visit number: 2  Approved number of visits: MN  Onset Date: 2/4/2024  Payor: [email or web address] / Plan: [employer] HE[employer] PLAN / Product Type: *No Product type* /     Precautions/Fall Risk: weight bearing status: non weight bearing for 2 more weeks until he sees the MD and will possibly progress weight bearing at that visit Pacemaker no  Seizures No  Post Op Movement/Restrictions Yes NWB left LE   MD note 5/6/24: can progress WBAT in boot with 2 crutches, then can wean crutches as able and then boot as strength and balance improve, hip and core and lower leg and intrinsic strengthening.,can do recumbent bike now without boot on, can work up to pool exercises once more comfortable WB     Subjective/Pain:  Current Pain Level (0-10): 0 Patient reports that he saw his doctor today - good report - able to begin weight bearing with boot and crutches - hasn't started yet though because he has a final tomorrow and does not want to be too sore for that.    HEP Compliance: Good    Objective/Outcome Measures: Right ankle PF 50 deg, DF 8 deg    Treatment  Therapeutic Procedure PT Therapeutic Procedures Time Entry  Manual Therapy Time Entry: 6  Therapeutic Exercise Time Entry: 43 minutes  Upright bike without boot level 1 x5 minutes   Seated prostretch x1 min bilateral right  Seated BAPS  right PF/DF x20 reps  Seated BAPS right INV/EV x20 reps  Seated BAPS right CW/CCW x5 reps ea  Right foot marble  3x10 marbles  Supine right SLR x20 reps  Sidelying right hip abd x20 reps  Prone right hip ext x20 reps  Supine red SB bridge 5 sec hold x15 reps  YTB right ankle PF 2x10 reps  YTB right ankle DF-x2x10 reps  Seated ankle alphabet A-Z  Supine right ankle INV/EV AROM 2x10 reps  Added YTB ankle PF and DF and ankle alphabet to HEP    Manual Therapy 6 minutes  Retrograde massage to foot to decrease edema - patient supine

## 2024-05-06 NOTE — PROGRESS NOTES
This is a pleasant 23 y.o. year old male who presents for fuv of  right ankle.   Doing PT, going well, no significant pain, working on ROM and early strengthening, using boot and crutches.    PHYSICAL EXAMINATION  Constitutional Exam: patient's height and weight reviewed, well-kempt  Psychiatric Exam: alert and oriented x 3, appropriate mood and behavior  Eye Exam: RAJIV, EOMI  Pulmonary Exam: breathing non-labored, no apparent distress  Lymphatic exam: no appreciable lymphadenopathy in the lower extremities  Cardiovascular exam: DP pulses 2+ bilaterally, PT 2+ bilaterally, toes are pink with good capillary refill, no pitting edema  Skin exam: no open lesions, rashes, abrasions or ulcerations  Neurological exam: sensation to light touch intact in both lower extremities in peripheral and dermatomal distributions (except for any abnormalities noted in musculoskeletal exam)    Musculoskeletal exam: right ankle: good ROM, ST joint ROM improving, swelling less, stable ligamentous exam, strength improving DF/PF/INV/EV    DATA/RESULTS REVIEW: I personally reviewed the patient's x-ray images and reports of the  right ankle shows healed distal fibular fracture, intact hardware, appropriate alignment, syndesmotic tightrope intact .     ASSESSMENT: s/p right ankle scope with extensive debridement, ORIF distal fibula, ORIF syndesmosis 3/19/24   PLAN: Further treatment options discussed including start weightbearing in boot with crutches.  Updated PT order, reviewed therapeutic exercises, can start recumbent or stationary bike.  FUV in 6 weeks.  The patient's questions were answered in detail.      Note dictated with [email or web address] software, completed without full type editing to avoid delay.

## 2024-05-13 ENCOUNTER — DOCUMENTATION (OUTPATIENT)
Dept: PHYSICAL THERAPY | Facility: HOSPITAL | Age: 24
End: 2024-05-13
Payer: COMMERCIAL

## 2024-05-13 ENCOUNTER — APPOINTMENT (OUTPATIENT)
Dept: PHYSICAL THERAPY | Facility: HOSPITAL | Age: 24
End: 2024-05-13
Payer: COMMERCIAL

## 2024-05-13 NOTE — PROGRESS NOTES
Physical Therapy                 Therapy Communication Note    Patient Name: Bon Mendoza  MRN: [id number]  Today's Date: 5/13/2024     Discipline: Physical Therapy    Missed Time: Cancel

## 2024-05-16 ENCOUNTER — TREATMENT (OUTPATIENT)
Dept: PHYSICAL THERAPY | Facility: HOSPITAL | Age: 24
End: 2024-05-16
Payer: COMMERCIAL

## 2024-05-16 DIAGNOSIS — R26.89 ANTALGIC GAIT: ICD-10-CM

## 2024-05-16 DIAGNOSIS — M25.571 RIGHT ANKLE PAIN: ICD-10-CM

## 2024-05-16 DIAGNOSIS — S82.831A TRAUMATIC CLOSED NONDISPLACED FRACTURE OF DISTAL FIBULA, RIGHT, INITIAL ENCOUNTER: ICD-10-CM

## 2024-05-16 DIAGNOSIS — M25.471 EDEMA OF RIGHT ANKLE: ICD-10-CM

## 2024-05-16 DIAGNOSIS — S82.51XA CLOSED AVULSION FRACTURE OF MEDIAL MALLEOLUS, RIGHT, INITIAL ENCOUNTER: ICD-10-CM

## 2024-05-16 PROCEDURE — 97110 THERAPEUTIC EXERCISES: CPT | Mod: GP

## 2024-05-16 NOTE — PROGRESS NOTES
Physical Therapy Treatment    Patient Name:Bon Mendoza  MRN:[id number]  Today's Date:5/16/2024  Referred by: Doreen Miller  Time Calculation  Start Time: 1030  Stop Time: 1125  Time Calculation (min): 55 min    Therapy Diagnosis  1. Traumatic closed nondisplaced fracture of distal fibula, right, initial encounter    2. Closed avulsion fracture of medial malleolus, right, initial encounter    3. Right ankle pain    4. Edema of right ankle    5. Antalgic gait         Assessment: Patient progressing very well in therapy.  Able to progress exercises without any increase symptoms.    Plan: Cont gradual progression of exercises - consider adding step over hurdles    Insurance  Visit number: 3  Approved number of visits: [place]  Onset Date: 2/4/2024  Payor: [employer] / Plan: [employer] HE[employer] PLAN / Product Type: *No Product type* /      Precautions/Fall Risk: weight bearing status: non weight bearing for 2 more weeks until he sees the MD and will possibly progress weight bearing at that visit Pacemaker no  Seizures No  Post Op Movement/Restrictions Yes NWB left LE   MD note 5/6/24: can progress WBAT in boot with 2 crutches, then can wean crutches as able and then boot as strength and balance improve, hip and core and lower leg and intrinsic strengthening.,can do recumbent bike now without boot on, can work up to pool exercises once more comfortable WB     Subjective/Pain:  Current Pain Level (0-10): 0 Patient reports that he stopped using crutches last Monday and last Thursday started weaning boot. Not wearing boot since last Friday.  Some soreness in his arch. Takes it easy and rests if it is bothering him. He got some new shoes that are really helping.  Has been biking in gym for about a half hour at a time. He has been walking dogs so after walking for about an hour he gets some foot and calf soreness but will rest the rest of the day and has been fine.  He will be working part time at the [employer] starting June 6th and  is doing some dog walking now.      HEP Compliance: Good    Objective/Outcome Measures: Patient ambulates with min decrease stance time right LE    Treatment  Therapeutic Procedure PT Therapeutic Procedures Time Entry  Manual Therapy Time Entry: 10  Therapeutic Exercise Time Entry: 40 minutes  Recumbent bike without boot x7 minutes  Slant board calf stretch 3 from top x1 min  TRX mini squats 2x10 reps  Rotary hip abd right 2 pl 2x10 reps  Rotary hip ext right 4 pl 2x10 reps  Standing BAPS right PF/DF x20 reps with left foot on the floor  Standing BAPS right INV/EV x20 reps with left foot on the floor  Standing BAPS right CW/CCW x5 reps ea with left foot on the floor  Prostretch x1 min  Blue foam march x1 min without UE assist  Round balance board x1 min with 1 UE assist  Right foot marble  2x12 marbles  Supine red SB bridge 5 sec hold 2x10 reps  RTB right ankle PF 2x10 reps  RTB right ankle DF 2x10 reps  YTB right ankle INV 2x10 reps  YTB right ankle EV 2x10 reps  Long sitting right ankle PNF x10 reps    Manual Therapy 10 minutes  STM to right foot and calf to decrease soreness/edema

## 2024-05-20 ENCOUNTER — TREATMENT (OUTPATIENT)
Dept: PHYSICAL THERAPY | Facility: HOSPITAL | Age: 24
End: 2024-05-20
Payer: COMMERCIAL

## 2024-05-20 DIAGNOSIS — R26.89 ANTALGIC GAIT: ICD-10-CM

## 2024-05-20 DIAGNOSIS — M25.471 EDEMA OF RIGHT ANKLE: ICD-10-CM

## 2024-05-20 DIAGNOSIS — S82.831A TRAUMATIC CLOSED NONDISPLACED FRACTURE OF DISTAL FIBULA, RIGHT, INITIAL ENCOUNTER: ICD-10-CM

## 2024-05-20 DIAGNOSIS — M25.571 RIGHT ANKLE PAIN: ICD-10-CM

## 2024-05-20 DIAGNOSIS — S82.51XA CLOSED AVULSION FRACTURE OF MEDIAL MALLEOLUS, RIGHT, INITIAL ENCOUNTER: ICD-10-CM

## 2024-05-20 PROCEDURE — 97110 THERAPEUTIC EXERCISES: CPT | Mod: GP

## 2024-05-20 PROCEDURE — 97140 MANUAL THERAPY 1/> REGIONS: CPT | Mod: GP

## 2024-05-20 NOTE — PROGRESS NOTES
Physical Therapy Treatment    Patient Name:Bon Mendoza  MRN:[id number]  Today's Date:5/20/2024  Referred by: Doreen Miller  Time Calculation  Start Time: 1115  Stop Time: 1210  Time Calculation (min): 55 min    Therapy Diagnosis  1. Traumatic closed nondisplaced fracture of distal fibula, right, initial encounter    2. Closed avulsion fracture of medial malleolus, right, initial encounter    3. Right ankle pain    4. Edema of right ankle    5. Antalgic gait         Assessment: Patient able to progress exercises without any increase soreness - progressing well toward goals    Plan: consider adding steamboats , SLS, and step over hurdles - patient to increase use of compression sleeve and ice to decrease edema    Insurance  Visit number: 4  Approved number of visits: MN  Onset Date: 2/4/2024  Payor: [employer] / Plan: [employer] HEALTH PLAN / Product Type: *No Product type* /      Precautions/Fall Risk: weight bearing status: non weight bearing for 2 more weeks until he sees the MD and will possibly progress weight bearing at that visit Pacemaker no  Seizures No  Post Op Movement/Restrictions Yes NWB left LE   MD note 5/6/24: can progress WBAT in boot with 2 crutches, then can wean crutches as able and then boot as strength and balance improve, hip and core and lower leg and intrinsic strengthening.,can do recumbent bike now without boot on, can work up to pool exercises once more comfortable WB     Subjective/Pain:  Current Pain Level (0-10): 2 A little soreness in his arch from walking - no instability. He had a very active weekend,k walked a lot over the weekend.  Biking for about 30 minutes at a time at the gym and it feels good.  He is looking forward to gradually increasing his walking distance.  Foot/ankle continue to have swelling.    HEP Compliance: Good    Objective/Outcome Measures: Patient ambulates with slightly antalgic gait    Treatment  Therapeutic Procedure PT Therapeutic Procedures Time Entry  Manual  Therapy Time Entry: 10  Therapeutic Exercise Time Entry: 45 minutes  Upright bike level 1 x6 minutes   Slant board calf stretch 3 from top x1 min  Step hamstring stretch right x1 minute  TRX mini squats 2x10 reps  Standing BAPS right PF/DF x20 reps with left foot on the floor  Standing BAPS right INV/EV x20 reps with left foot on the floor  Standing BAPS right CW/CCW x10 reps ea with left foot on the floor  Rotary hip abd right 2 pl 2x10 reps  Rotary hip ext right 4 pl 2x10 reps  Prostretch x1 min  Blue foam march x1 min without UE assist  Round balance board x1 min with 1 UE assist  Square balance board x1 minute fwd/back and med/lat  Gentle mini lunge fwd onto BOSU with 1 UE assist x10 reps bilateral  Right foot marble  2x12 marbles  Supine red SB bridge 5 sec hold 2x10 reps  RTB right ankle PF 2x10 reps  RTB right ankle DF 2x10 reps  YTB right ankle INV 2x10 reps  YTB right ankle EV 2x10 reps  Long sitting right ankle PNF x10 reps    Manual Therapy 10 minutes  STM to right foot and calf to decrease soreness/edema

## 2024-05-23 ENCOUNTER — TREATMENT (OUTPATIENT)
Dept: PHYSICAL THERAPY | Facility: HOSPITAL | Age: 24
End: 2024-05-23
Payer: COMMERCIAL

## 2024-05-23 DIAGNOSIS — M25.571 RIGHT ANKLE PAIN: ICD-10-CM

## 2024-05-23 DIAGNOSIS — R26.89 ANTALGIC GAIT: ICD-10-CM

## 2024-05-23 DIAGNOSIS — M25.471 EDEMA OF RIGHT ANKLE: ICD-10-CM

## 2024-05-23 DIAGNOSIS — S82.831A TRAUMATIC CLOSED NONDISPLACED FRACTURE OF DISTAL FIBULA, RIGHT, INITIAL ENCOUNTER: ICD-10-CM

## 2024-05-23 DIAGNOSIS — S82.51XA CLOSED AVULSION FRACTURE OF MEDIAL MALLEOLUS, RIGHT, INITIAL ENCOUNTER: ICD-10-CM

## 2024-05-23 PROCEDURE — 97110 THERAPEUTIC EXERCISES: CPT | Mod: GP

## 2024-05-23 NOTE — PROGRESS NOTES
Physical Therapy Treatment    Patient Name:Bon Mendoza  MRN:[id number]  Today's Date:5/23/2024  Referred by: Doreen Miller  Time Calculation  Start Time: 1037  Stop Time: 1124  Time Calculation (min): 47 min    Therapy Diagnosis  1. Traumatic closed nondisplaced fracture of distal fibula, right, initial encounter    2. Closed avulsion fracture of medial malleolus, right, initial encounter    3. Right ankle pain    4. Edema of right ankle    5. Antalgic gait         Assessment: Patient progressing very well in therapy.  Able to progress exercises without any increase in symptoms.      Plan: Consider adding step over hurdles next visit and single leg tramp toss    Insurance  Visit number: 5  Approved number of visits: MN  Onset Date: 2/4/2024  Payor: [employer] / Plan: [employer] HE[employer] PLAN / Product Type: *No Product type* /      Precautions/Fall Risk: weight bearing status: non weight bearing for 2 more weeks until he sees the MD and will possibly progress weight bearing at that visit Pacemaker no  Seizures No  Post Op Movement/Restrictions Yes NWB left LE   MD note 5/6/24: can progress WBAT in boot with 2 crutches, then can wean crutches as able and then boot as strength and balance improve, hip and core and lower leg and intrinsic strengthening.,can do recumbent bike now without boot on, can work up to pool exercises once more comfortable WB     Subjective/Pain:  Current Pain Level (0-10): 2 Patient reports that his ankle is feeling good.  Feeling hot from the weather.      HEP Compliance: Good    Objective/Outcome Measures:    Treatment  Therapeutic Procedure PT Therapeutic Procedures Time Entry  Therapeutic Exercise Time Entry: 45 minutes  Recumbent bike level 1 x6 minutes   Slant board calf stretch 3 from top x1 min  Step hamstring stretch right x1 minute  TRX mini squats 2x10 reps   Standing BAPS right PF/DF x20 reps with left foot on the floor  Standing BAPS right INV/EV x20 reps with left foot on the  floor  Standing BAPS right CW/CCW x10 reps ea with left foot on the floor  Rotary hip abd right 2 pl 3x10 reps  Rotary hip ext right 4 pl 3x10 reps  Prostretch x1 min  Blue foam march x1 min without UE assist  Square balance board x1 min with min UE assist  Square balance board fwd/back and med/lat x20 reps ea  Fwd step onto BOSU with right LE and right bar hold x10 reps  Gentle mini lunge fwd onto BOSU with 1 UE assist x10 reps bilateral  Steamboats right stance without resistance x10 reps abd and ext  Right SLS 5 sec hold x5 reps  Right SLS with left march 5 sec hold x5 reps  Right foot marble  2x12 marbles  Supine red SB bridge 5 sec hold 2x10 reps  RTB right ankle PF 3x10 reps  RTB right ankle DF 3x10 reps  RTB right ankle INV 3x10 reps  RTB right ankle EV 3x10 reps

## 2024-05-28 ENCOUNTER — TREATMENT (OUTPATIENT)
Dept: PHYSICAL THERAPY | Facility: HOSPITAL | Age: 24
End: 2024-05-28
Payer: COMMERCIAL

## 2024-05-28 DIAGNOSIS — M25.471 EDEMA OF RIGHT ANKLE: ICD-10-CM

## 2024-05-28 DIAGNOSIS — S82.831A TRAUMATIC CLOSED NONDISPLACED FRACTURE OF DISTAL FIBULA, RIGHT, INITIAL ENCOUNTER: ICD-10-CM

## 2024-05-28 DIAGNOSIS — M25.571 RIGHT ANKLE PAIN: ICD-10-CM

## 2024-05-28 DIAGNOSIS — S82.51XA CLOSED AVULSION FRACTURE OF MEDIAL MALLEOLUS, RIGHT, INITIAL ENCOUNTER: ICD-10-CM

## 2024-05-28 DIAGNOSIS — R26.89 ANTALGIC GAIT: ICD-10-CM

## 2024-05-28 PROCEDURE — 97110 THERAPEUTIC EXERCISES: CPT | Mod: GP

## 2024-05-28 NOTE — PROGRESS NOTES
Physical Therapy Treatment    Patient Name:Bon Mendoza  MRN:[id number]  Today's Date:5/28/2024  Referred by: Doreen Miller  Time Calculation  Start Time: 1345  Stop Time: 1431  Time Calculation (min): 46 min    Therapy Diagnosis  1. Traumatic closed nondisplaced fracture of distal fibula, right, initial encounter    2. Closed avulsion fracture of medial malleolus, right, initial encounter    3. Right ankle pain    4. Edema of right ankle    5. Antalgic gait         Assessment: calf atrophy noted right calf - no c/o increased soreness with treatment - patient pleased with progress    Plan: Cont strengthening right foot/ankle, balance/proprioception    Insurance  Visit number: 6  Approved number of visits: [place]  Onset Date: 2/4/2024  Payor: [employer] / Plan: [employer] HEALTH PLAN / Product Type: *No Product type* /      Precautions/Fall Risk: weight bearing status: non weight bearing for 2 more weeks until he sees the MD and will possibly progress weight bearing at that visit Pacemaker no  Seizures No  Post Op Movement/Restrictions Yes NWB left LE   MD note 5/6/24: can progress WBAT in boot with 2 crutches, then can wean crutches as able and then boot as strength and balance improve, hip and core and lower leg and intrinsic strengthening.,can do recumbent bike now without boot on, can work up to pool exercises once more comfortable WB     Subjective/Pain:  Current Pain Level (0-10): 0 Patient reports that his ankle has been feeling good.  He went home to [facility] over the weekend.  Long drive home - about 5.5 hours.  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  Need to cancel other appt this week.  Moving this weekend.    HEP Compliance: Good    Objective/Outcome Measures: Patient ambulates without gait deviations    Treatment  Therapeutic Procedure PT Therapeutic Procedures Time Entry  Therapeutic Exercise Time Entry: 45 minutes  Upright bike level 1 x6 minutes  Slant  board calf stretch 3 from top x1 min  Step hamstring stretch right x1 minute  TRX mini squats 2x10 reps   Standing BAPS right PF/DF x15 reps with left foot on board   Standing BAPS right INV/EV x10 reps with left foot on the floor  Standing BAPS right CW/CCW x10 reps ea with left foot on the floor  Rotary hip abd right 2 pl 2x10 reps  Rotary hip ext right 4 pl 2x10 reps  Prostretch x1 min  Blue foam march x1 min without UE assist  Square balance board x1 min fwd/back and med/lat  Square balance board fwd/back and med/lat x20 reps ea  Gentle mini lunge fwd onto BOSU with 1 UE assist x15 reps bilateral with opp foot lift  Heel raise bilateral w UE hold x10 reps  Heel raise bilateral w UE hold and 5 sec lower x10 reps  Right SLS 10 sec hold x3 reps  Jump  leg press level 4 stop 7 with 1 yellow and 1 blue cord 2x10 reps  Steamboats right stance with RTB resistance abd, ext, and flex x10 reps slow, x10 reps fast  Right SLS 4# tramp toss 2x10 reps with break between each toss  RTB right ankle PF 3x10 reps  RTB right ankle DF 3x10 reps  RTB right ankle INV 3x10 reps  RTB right ankle EV 3x10 reps  Supine red SB bridge 5 sec hold 3x10 reps

## 2024-05-30 ENCOUNTER — APPOINTMENT (OUTPATIENT)
Dept: PHYSICAL THERAPY | Facility: HOSPITAL | Age: 24
End: 2024-05-30
Payer: COMMERCIAL

## 2024-06-03 ENCOUNTER — DOCUMENTATION (OUTPATIENT)
Dept: PHYSICAL THERAPY | Facility: HOSPITAL | Age: 24
End: 2024-06-03
Payer: COMMERCIAL

## 2024-06-03 ENCOUNTER — APPOINTMENT (OUTPATIENT)
Dept: PHYSICAL THERAPY | Facility: HOSPITAL | Age: 24
End: 2024-06-03
Payer: COMMERCIAL

## 2024-06-03 NOTE — PROGRESS NOTES
Physical Therapy                 Therapy Communication Note    Patient Name: Bon Mendoza  MRN: [id number]  Today's Date: 6/3/2024     Discipline: Physical Therapy    Missed Time: Cancel

## 2024-06-05 ENCOUNTER — TREATMENT (OUTPATIENT)
Dept: PHYSICAL THERAPY | Facility: HOSPITAL | Age: 24
End: 2024-06-05
Payer: COMMERCIAL

## 2024-06-05 DIAGNOSIS — R26.89 ANTALGIC GAIT: ICD-10-CM

## 2024-06-05 DIAGNOSIS — M25.471 EDEMA OF RIGHT ANKLE: ICD-10-CM

## 2024-06-05 DIAGNOSIS — S82.831A TRAUMATIC CLOSED NONDISPLACED FRACTURE OF DISTAL FIBULA, RIGHT, INITIAL ENCOUNTER: ICD-10-CM

## 2024-06-05 DIAGNOSIS — S82.51XA CLOSED AVULSION FRACTURE OF MEDIAL MALLEOLUS, RIGHT, INITIAL ENCOUNTER: ICD-10-CM

## 2024-06-05 DIAGNOSIS — M25.571 RIGHT ANKLE PAIN: ICD-10-CM

## 2024-06-05 PROCEDURE — 97110 THERAPEUTIC EXERCISES: CPT | Mod: GP

## 2024-06-05 NOTE — PROGRESS NOTES
Physical Therapy Treatment    Patient Name:Bon Mendoza  MRN:[id number]  Today's Date:6/5/2024  Referred by: Doreen Miller  Time Calculation  Start Time: 1310  Stop Time: 1359  Time Calculation (min): 49 min    Therapy Diagnosis  1. Traumatic closed nondisplaced fracture of distal fibula, right, initial encounter    2. Closed avulsion fracture of medial malleolus, right, initial encounter    3. Right ankle pain    4. Edema of right ankle    5. Antalgic gait         Assessment: Patient progressing very well in therapy - able to progress exercises without symptoms    Plan: Cont progression of balance/strength    Insurance  Visit number: 7  Approved number of visits: MN  Onset Date: 2/4/2024  Payor: [employer] / Plan: [employer] HEALTH PLAN / Product Type: *No Product type* /      Precautions/Fall Risk: weight bearing status: non weight bearing for 2 more weeks until he sees the MD and will possibly progress weight bearing at that visit Pacemaker no  Seizures No  Post Op Movement/Restrictions Yes NWB left LE   MD note 5/6/24: can progress WBAT in boot with 2 crutches, then can wean crutches as able and then boot as strength and balance improve, hip and core and lower leg and intrinsic strengthening.,can do recumbent bike now without boot on, can work up to pool exercises once more comfortable WB     Subjective/Pain:  Current Pain Level (0-10): 0 Patient reports that his legs are sore from moving recently - had to go up and down a lot of stairs.  Ankle feels good though.  Some muscular tightness.  First day at work tomorrow.  Swelling was close to normal before the move but has increased since then.    HEP Compliance: Good    Objective/Outcome Measures: Patient able to perform right SLS x20 sec without UE assist    Treatment  Therapeutic Procedure PT Therapeutic Procedures Time Entry  Therapeutic Exercise Time Entry: 45 minutes  Upright bike level 1 x6 minutes  Slant board calf stretch x1 min  Step hamstring stretch x1 min  TRX  mini squats 2x10 reps   Rotary hip abd right 2 pl 2x10 reps  Rotary hip ext right 4 pl 2x10 reps  Standing BAPS right PF/DF x20 reps with left foot on board   Standing BAPS right INV/EV x10 reps with left foot on board  Standing BAPS right CW/CCW x10 reps ea with left foot on board  Prostretch x1 min  Blue foam march x1 min without UE assist  Square balance board x1 min fwd/back and med/lat  Fwd mini lunge onto BOSU x10 reps bilateral  Heel raise bilateral w 1 UE hold 2x10 reps  Heel raise bilateral w 1 UE hold and 5 sec lower x10 reps  Heel raise bilateral w 1 UE hold and slow lower x4 reps bilateral LEs  Right SLS x20 sec   Right SLS w eyes closed 5 sec x3 reps  Heel toe walk 2x20 ft  Karioke 2x20 ft  High knee march walk 2x20 ft  Jump  leg press level 4 stop 7 with 1 yellow and 1 blue cord 3x10 reps  Steamboats right stance with YTB resistance add, abd, ext, and flex x10 reps slow, x10 reps fast  Right SLS 4# tramp toss 2x10 reps with break between each toss, on blue foam x10 reps w break between each toss  Ladder drills    Slow lateral shuffle down and back x2 reps   Slow lateral diagonal  down and back   Slow lateral down 2 back 1 down and back   Multi angle board reach to 60 deg w stance on right LE  Supine red SB bridge 5 sec hold 2x10 reps

## 2024-06-12 ENCOUNTER — TREATMENT (OUTPATIENT)
Dept: PHYSICAL THERAPY | Facility: HOSPITAL | Age: 24
End: 2024-06-12
Payer: COMMERCIAL

## 2024-06-12 DIAGNOSIS — M25.471 EDEMA OF RIGHT ANKLE: ICD-10-CM

## 2024-06-12 DIAGNOSIS — R26.89 ANTALGIC GAIT: ICD-10-CM

## 2024-06-12 DIAGNOSIS — M25.571 RIGHT ANKLE PAIN: ICD-10-CM

## 2024-06-12 DIAGNOSIS — S82.831A TRAUMATIC CLOSED NONDISPLACED FRACTURE OF DISTAL FIBULA, RIGHT, INITIAL ENCOUNTER: ICD-10-CM

## 2024-06-12 DIAGNOSIS — S82.51XA CLOSED AVULSION FRACTURE OF MEDIAL MALLEOLUS, RIGHT, INITIAL ENCOUNTER: ICD-10-CM

## 2024-06-12 PROCEDURE — 97110 THERAPEUTIC EXERCISES: CPT | Mod: GP

## 2024-06-12 NOTE — PROGRESS NOTES
Physical Therapy Treatment    Patient Name:Bon Mendoza  MRN:[id number]  Today's Date:6/12/2024  Referred by: Doreen Miller  Time Calculation  Start Time: 1604  Stop Time: 1650  Time Calculation (min): 46 min    Therapy Diagnosis  1. Traumatic closed nondisplaced fracture of distal fibula, right, initial encounter    2. Closed avulsion fracture of medial malleolus, right, initial encounter    3. Right ankle pain    4. Edema of right ankle    5. Antalgic gait         Assessment: Patient continues to progress very well in therapy - improved balance/confidence noted with exercises    Plan: Consider adding cable column retro and lateral walk w resistance    Insurance  Visit number: 8  Approved number of visits: [place]  Onset Date: 2/4/2024  Payor: [employer] / Plan: [employer] HEALTH PLAN / Product Type: *No Product type* /      Precautions/Fall Risk: weight bearing status: non weight bearing for 2 more weeks until he sees the MD and will possibly progress weight bearing at that visit Pacemaker no  Seizures No  Post Op Movement/Restrictions Yes NWB left LE   MD note 5/6/24: can progress WBAT in boot with 2 crutches, then can wean crutches as able and then boot as strength and balance improve, hip and core and lower leg and intrinsic strengthening.,can do recumbent bike now without boot on, can work up to pool exercises once more comfortable WB     Subjective/Pain:  Current Pain Level (0-10): 0 Patient reports that he has had a busy day walking a puppy every couple hours for potty training - the puppy has kept him quite busy.  Legs are sore today from being very busy with dogs/move/etc.      HEP Compliance: Good    Objective/Outcome Measures: Patient able to perform right SLS x30 sec     Treatment  Therapeutic Procedure PT Therapeutic Procedures Time Entry  Therapeutic Exercise Time Entry: 45 minutes  Upright bike level 1 x6 minutes  Slant board calf stretch x1 min  Step hamstring stretch x1 min  Red SB wall mini squats 2x10 reps    Rotary hip abd right 2 pl 2x10 reps  Rotary hip ext right 4 pl 2x10 reps  Standing BAPS level 3 right PF/DF x1 min with left foot on board   Standing BAPS right INV/EV x30 sec with left foot on board  Standing BAPS right CW/CCW x10 reps ea with left foot on board  Square balance board x1 min  Square balance board x10 sec right only  BOSU mini lunge x5 reps bilateral, x5 reps with opp leg march w 1 hand hold   Step onto and over BOSU x5 reps fwd/back  Right SLS x3 sec w eyes closed   Heel toe walk 2x20 ft  Karioke 2x20 ft  High knee march walk 2x20 ft  Prostretch x1 min  Right SLS 4# tramp toss x20 reps w break between each toss  Heel raise bilateral and 5 sec lower 2x10 reps eccentric on step  Heel raise bilateral and slow lower right x10 reps at parallel bar   Right SLS x30 sec  Steamboats right stance with 5# at cable column x5 reps slow, x5 reps fast add, abd, ext, and fle  Jump  leg press level 4 stop 7 with 1 yellow and 1 blue cord 3x10 reps  Multi angle board reach to 60 deg w stance on right LE  Plank x20 sec  Supine bridge w SLR x10 reps bilateral    Not today:  Ladder drills               Slow lateral shuffle down and back x2 reps              Slow lateral diagonal  down and back              Slow lateral down 2 back 1 down and back

## 2024-06-14 ENCOUNTER — APPOINTMENT (OUTPATIENT)
Dept: PHYSICAL THERAPY | Facility: HOSPITAL | Age: 24
End: 2024-06-14
Payer: COMMERCIAL

## 2024-06-17 ENCOUNTER — APPOINTMENT (OUTPATIENT)
Dept: RADIOLOGY | Facility: HOSPITAL | Age: 24
End: 2024-06-17
Payer: COMMERCIAL

## 2024-06-17 ENCOUNTER — APPOINTMENT (OUTPATIENT)
Dept: ORTHOPEDIC SURGERY | Facility: HOSPITAL | Age: 24
End: 2024-06-17
Payer: COMMERCIAL

## 2024-06-17 DIAGNOSIS — S82.831A TRAUMATIC CLOSED NONDISPLACED FRACTURE OF DISTAL FIBULA, RIGHT, INITIAL ENCOUNTER: ICD-10-CM

## 2024-06-17 DIAGNOSIS — S93.491A SYNDESMOTIC ANKLE SPRAIN, RIGHT, INITIAL ENCOUNTER: ICD-10-CM

## 2024-06-19 ENCOUNTER — TREATMENT (OUTPATIENT)
Dept: PHYSICAL THERAPY | Facility: HOSPITAL | Age: 24
End: 2024-06-19
Payer: COMMERCIAL

## 2024-06-19 DIAGNOSIS — S82.51XA CLOSED AVULSION FRACTURE OF MEDIAL MALLEOLUS, RIGHT, INITIAL ENCOUNTER: ICD-10-CM

## 2024-06-19 DIAGNOSIS — M25.571 RIGHT ANKLE PAIN: ICD-10-CM

## 2024-06-19 DIAGNOSIS — M25.471 EDEMA OF RIGHT ANKLE: ICD-10-CM

## 2024-06-19 DIAGNOSIS — R26.89 ANTALGIC GAIT: ICD-10-CM

## 2024-06-19 DIAGNOSIS — S82.831A TRAUMATIC CLOSED NONDISPLACED FRACTURE OF DISTAL FIBULA, RIGHT, INITIAL ENCOUNTER: ICD-10-CM

## 2024-06-19 PROCEDURE — 97110 THERAPEUTIC EXERCISES: CPT | Mod: GP

## 2024-06-19 NOTE — PROGRESS NOTES
Physical Therapy Treatment    Patient Name:Bon Mendoza  MRN:[id number]  Today's Date:6/19/2024  Referred by: Doreen Miller  Time Calculation  Start Time: 1443  Stop Time: 1526  Time Calculation (min): 43 min    Therapy Diagnosis  1. Traumatic closed nondisplaced fracture of distal fibula, right, initial encounter    2. Closed avulsion fracture of medial malleolus, right, initial encounter    3. Right ankle pain    4. Edema of right ankle    5. Antalgic gait         Assessment: Patient somewhat challenged with advanced balance exercises but no c/o increased soreness - able to progress exercises     Plan: Cont to progress POC to gradually progress balance/agility    Insurance  Visit number: 9  Approved number of visits: MN  Onset Date: 2/4/2024  Payor: [employer] / Plan: [employer] HEALTH PLAN / Product Type: *No Product type* /      Precautions/Fall Risk: weight bearing status: non weight bearing for 2 more weeks until he sees the MD and will possibly progress weight bearing at that visit Pacemaker no  Seizures No  Post Op Movement/Restrictions Yes NWB left LE   MD note 5/6/24: can progress WBAT in boot with 2 crutches, then can wean crutches as able and then boot as strength and balance improve, hip and core and lower leg and intrinsic strengthening.,can do recumbent bike now without boot on, can work up to pool exercises once more comfortable WB     Subjective/Pain:  Current Pain Level (0-10): 0 Patient reports that his ankle has been feeling good.  His puppy pulled him along yesterday and he was jogging and it felt good.  Only soreness is in legs from doing a lot of stairs.  He missed his last post op appt with MD this past Monday.  Ankle has been feeling really good.    HEP Compliance: Good    Objective/Outcome Measures: Patient able to perform right SLS x30 sec without UE assist    Treatment  Therapeutic Procedure PT Therapeutic Procedures Time Entry  Therapeutic Exercise Time Entry: 40 minutes  Upright bike level 1 x6  minutes  Slant board calf stretch x1 min  Step hamstring stretch x1 min  Red SB wall mini squats 3x10 reps   Rotary hip abd right 2 pl 2x10 reps  Rotary hip ext right 4 pl 2x10 reps  Standing BAPS level 3 right PF/DF x1 min with left foot on board   Standing BAPS right INV/EV x30 sec with left foot on board  Standing BAPS right CW/CCW x10 reps ea with left foot on board  Right SLS 4# tramp toss x20 reps w break between each toss, standing on blue foam  Right SLS 4# tramp toss 5 reps in a row x4 reps  Square balance board x30 sec right only in parallel bars fwd/back and med/lat  Upside down BOSU right stance left march x10 reps, right stance left HS curl x10 reps  BOSU mini lunge with opp leg march w x10 reps bilateral with no hand hold  Step onto and over BOSU x5 reps fwd/back  Right SLS x30 sec   Karioke 2x18 ft  Slow side shuffle 2x18 ft  Slow back shuffle 2x18 ft  Prostretch x1 min right   Heel raise bilateral and eccentric lower right 2x10 reps  Ladder drills               Slow lateral shuffle down and back x2 reps              Slow lateral diagonal  down and back              Standing on lateral side in in out out x2 reps  Cable column retro walk 40# x10 reps  Cable column lateral walk 20# x5 reps ea  Steamboats right stance with 10# at cable column x5 reps slow, x5 reps fast add, abd, ext, and flex  Elliptical level 1 x2 min    Not today:  Multi angle board reach to 60 deg w stance on right LE  Plank x20 sec  Supine bridge w SLR x10 reps bilateral     0

## 2024-06-21 ENCOUNTER — APPOINTMENT (OUTPATIENT)
Dept: PHYSICAL THERAPY | Facility: HOSPITAL | Age: 24
End: 2024-06-21
Payer: COMMERCIAL

## 2024-06-25 ENCOUNTER — TREATMENT (OUTPATIENT)
Dept: PHYSICAL THERAPY | Facility: HOSPITAL | Age: 24
End: 2024-06-25
Payer: COMMERCIAL

## 2024-06-25 DIAGNOSIS — R26.89 ANTALGIC GAIT: ICD-10-CM

## 2024-06-25 DIAGNOSIS — M25.571 RIGHT ANKLE PAIN: ICD-10-CM

## 2024-06-25 DIAGNOSIS — S82.51XA CLOSED AVULSION FRACTURE OF MEDIAL MALLEOLUS, RIGHT, INITIAL ENCOUNTER: ICD-10-CM

## 2024-06-25 DIAGNOSIS — M25.471 EDEMA OF RIGHT ANKLE: ICD-10-CM

## 2024-06-25 DIAGNOSIS — S82.831A TRAUMATIC CLOSED NONDISPLACED FRACTURE OF DISTAL FIBULA, RIGHT, INITIAL ENCOUNTER: ICD-10-CM

## 2024-06-25 PROCEDURE — 97110 THERAPEUTIC EXERCISES: CPT | Mod: GP

## 2024-06-25 NOTE — PROGRESS NOTES
Physical Therapy Treatment    Patient Name:Bon Mendoza  MRN:[id number]  Today's Date:6/25/2024  Referred by: Doreen Miller  Time Calculation  Start Time: 1352  Stop Time: 1437  Time Calculation (min): 45 min    Therapy Diagnosis  1. Traumatic closed nondisplaced fracture of distal fibula, right, initial encounter    2. Closed avulsion fracture of medial malleolus, right, initial encounter    3. Right ankle pain    4. Edema of right ankle    5. Antalgic gait         Assessment:  Patient is progressing very well in therapy - able to progress exercises without any increase in symptoms - significant improvements in ankle ROM,strength and function noted    Plan: Cont to gradually progress return to sport activities    Insurance  Visit number: 10  Approved number of visits: MN  Onset Date: 2/4/2024  Payor: [employer] / Plan: [employer] HEALTH PLAN / Product Type: *No Product type* /      Precautions/Fall Risk: weight bearing status: non weight bearing for 2 more weeks until he sees the MD and will possibly progress weight bearing at that visit Pacemaker no  Seizures No  Post Op Movement/Restrictions Yes NWB left LE   MD note 5/6/24: can progress WBAT in boot with 2 crutches, then can wean crutches as able and then boot as strength and balance improve, hip and core and lower leg and intrinsic strengthening.,can do recumbent bike now without boot on, can work up to pool exercises once more comfortable WB     Subjective/Pain:  Current Pain Level (0-10): 0 Patient reports that his ankle has been feeling great!  No issues!    HEP Compliance: Good    Objective/Outcome Measures: Initial // Current  Lower Extremity Funtional Score (LEFS): 36 // 71/80  R/L ankle PF AROM (degrees): 40 // 55  R/L ankle DF AROM (degrees): neutral // 10  R/L ankle INV AROM (degrees): 5 // 20  R/L ankle EV AROM (degrees): 5 // 10  RR/L ankle PF strength (MMT): NT // 4+/5  R/L ankle DF strength (MMT): NT // 5/5  R/L ankle INV strength (MMT): NT // 5/5  R/L  ankle EV strength (MMT): NT // 5/5  Gait: NWB, crutches // no assistive device, no limp  Patient able to perform right SLS x40 sec without UE assist    Treatment  Therapeutic Procedure PT Therapeutic Procedures Time Entry  Therapeutic Exercise Time Entry: 45 minutes  Upright bike level 1 x5 minutes  Slant board calf stretch x1 min  Step hamstring stretch x1 min  Red SB wall mini squats 2x10 reps   Rotary hip abd right 2 pl 2x10 reps  Rotary hip ext right 4 pl 2x10 reps  Shuttle leg press level 4 stop 6 1 blue and 2 yellow cords 2x10 reps bilateral  Shuttle leg press level 4 stop 6 1 blue and 1 yellow cord right only x10 reps  Shuttle leg press mini hops level 1 with 0 cords 3x10 reps  Right SLS 4# tramp toss x20 reps in a row   Right SLS 4# tramp toss 2x10 reps standing on blue foam  Square balance board x30 sec right only in parallel bars fwd/back and med/lat  Upside down BOSU right stance left march x15 reps  BOSU lateral step up and over in parallel bars x5 reps  Right SLS x40 sec   Karioke 2x18 ft  Side shuffle 2x18 ft  Back shuffle 2x18 ft  Prostretch x1 min right   Heel raise bilateral and eccentric lower right 2x10 reps  Ladder drills               lateral shuffle down and back x2 reps  Standing on lateral side in in out out x2 reps              Slow lateral diagonal  down and back  Elliptical level 1 x3 min     Not today:  Multi angle board reach to 60 deg w stance on right LE  Plank x20 sec  Supine bridge w SLR x10 reps bilateral  Cable column retro walk 40# x10 reps  Cable column lateral walk 20# x5 reps ea  Steamboats right stance with 10# at cable column x5 reps slow, x5 reps fast add, abd, ext, and flex    Goals:  Demonstrate independence with home exercise program Partially met  Tolerate increased exercise without adverse reaction Met  Increase ROM of right ankle PF to 45 deg and DF to 5 deg to improve the ability to perform essential ADLs Met  Increase strength of right ankle PF/DF to 4+/5 MMT to  improve the ability to perform essential ADLs Partially Met  Report decrease in pain by >= 2 points to meet MCID Met  Increase score of LEFS by > 9 points to meet the MCID Met  Ambulate during therapy session without an increase in symptoms Met  Patient stated goal: rehab the ankle for active use     Planned interventions include prn: cryotherapy, edema control, electrical stimulation, gait training, home exercise program, kinesiotaping, manual therapy, therapeutic exercise, vasopneumatic device with cold  Frequency and duration: 1-2 time(s) a week, or up to 10 additional visits .   Plan of care was developed with input and agreement by the patient.

## 2024-07-03 ENCOUNTER — APPOINTMENT (OUTPATIENT)
Dept: PHYSICAL THERAPY | Facility: HOSPITAL | Age: 24
End: 2024-07-03
Payer: COMMERCIAL

## 2024-07-12 ENCOUNTER — TREATMENT (OUTPATIENT)
Dept: PHYSICAL THERAPY | Facility: HOSPITAL | Age: 24
End: 2024-07-12
Payer: COMMERCIAL

## 2024-07-12 DIAGNOSIS — M25.571 RIGHT ANKLE PAIN: ICD-10-CM

## 2024-07-12 DIAGNOSIS — S82.51XA CLOSED AVULSION FRACTURE OF MEDIAL MALLEOLUS, RIGHT, INITIAL ENCOUNTER: ICD-10-CM

## 2024-07-12 DIAGNOSIS — S82.831A TRAUMATIC CLOSED NONDISPLACED FRACTURE OF DISTAL FIBULA, RIGHT, INITIAL ENCOUNTER: ICD-10-CM

## 2024-07-12 DIAGNOSIS — M25.471 EDEMA OF RIGHT ANKLE: ICD-10-CM

## 2024-07-12 DIAGNOSIS — R26.89 ANTALGIC GAIT: ICD-10-CM

## 2024-07-12 PROCEDURE — 97110 THERAPEUTIC EXERCISES: CPT | Mod: GP

## 2024-07-12 NOTE — PROGRESS NOTES
Physical Therapy Treatment    Patient Name:Bon Mendoza  MRN:[id number]  Today's Date:7/12/2024  Referred by: Doreen Miller  Time Calculation  Start Time: 1120  Stop Time: 1210  Time Calculation (min): 50 min    Therapy Diagnosis   Problem List Items Addressed This Visit             ICD-10-CM    Traumatic closed nondisplaced fracture of distal fibula, right, initial encounter S82.831A    Right ankle pain M25.571    Edema of right ankle M25.471    Antalgic gait R26.89     Other Visit Diagnoses         Codes    Closed avulsion fracture of medial malleolus, right, initial encounter     S82.51XA            Assessment: Patient progressing very well in therapy - able to progress agility without issue    Plan: Cont progression of sporting/agility exercises (basketball) - probable discharge 8/2 - consider adding plank walk    Insurance  Visit number: 11  Approved number of visits: MN  Onset Date: 2/4/2024  Payor: [employer] / Plan: [employer] HEALTH PLAN / Product Type: *No Product type* /      Precautions/Fall Risk: weight bearing status: non weight bearing for 2 more weeks until he sees the MD and will possibly progress weight bearing at that visit Pacemaker no  Seizures No  Post Op Movement/Restrictions Yes NWB left LE   MD note 5/6/24: can progress WBAT in boot with 2 crutches, then can wean crutches as able and then boot as strength and balance improve, hip and core and lower leg and intrinsic strengthening.,can do recumbent bike now without boot on, can work up to pool exercises once more comfortable WB     Subjective/Pain:  Current Pain Level (0-10): 0 Patient reports that he has been going to the gym consistently and biked yesterday at level 5.  Pleased with his progress. On July 4th played [employer] Majeste and didn't stress himself too much - felt good at the time but sore the next day.  Resolved pretty quickly.    HEP Compliance: Good    Objective/Outcome Measures: Patient able to use elliptical at level 1 x5  minutes    Treatment  Therapeutic Procedure PT Therapeutic Procedures Time Entry  Therapeutic Exercise Time Entry: 50 minutes  Upright bike by TM level 1 x5 minutes  Slant board calf stretch x1 min  Step hamstring stretch x1 min  Rotary hip abd right 2 pl 2x10 reps  Rotary hip ext right 4 pl 2x10 reps  Red SB wall mini squats 2x10 reps   Shuttle leg press level 5 stop 6 1 blue and 2 yellow cords 2x10 reps bilateral   Shuttle leg press level 5 stop 6 1 blue and 1 yellow cord right only x10 reps   Shuttle leg press mini hops level 1 with 0 cords 3x10 reps   Right SLS 4# tramp toss x20 reps standing on blue foam with foot down between each rep, 3x5 reps in a row  Square balance board x30 sec right only without UE assist fwd/back and med/lat  Upside down BOSU right stance left march x15 reps  BOSU lateral step up and over in parallel bars x5 reps  Right SLS x40 sec   Karioke 2x18 ft   Side shuffle 2x18 ft  Back shuffle 2x18 ft  Prostretch x1 min right   Heel raise bilateral and eccentric lower right x1 min  Ladder drills               lateral shuffle down and back x2 reps  Standing on lateral side in in out out x2 reps              Lateral diagonal down and back  Elliptical level 1 x5 min

## 2024-07-17 ENCOUNTER — APPOINTMENT (OUTPATIENT)
Dept: PHYSICAL THERAPY | Facility: HOSPITAL | Age: 24
End: 2024-07-17
Payer: COMMERCIAL

## 2024-07-23 ENCOUNTER — TREATMENT (OUTPATIENT)
Dept: PHYSICAL THERAPY | Facility: HOSPITAL | Age: 24
End: 2024-07-23
Payer: COMMERCIAL

## 2024-07-23 DIAGNOSIS — R26.89 ANTALGIC GAIT: ICD-10-CM

## 2024-07-23 DIAGNOSIS — S82.831A TRAUMATIC CLOSED NONDISPLACED FRACTURE OF DISTAL FIBULA, RIGHT, INITIAL ENCOUNTER: ICD-10-CM

## 2024-07-23 DIAGNOSIS — M25.471 EDEMA OF RIGHT ANKLE: ICD-10-CM

## 2024-07-23 DIAGNOSIS — S82.51XA CLOSED AVULSION FRACTURE OF MEDIAL MALLEOLUS, RIGHT, INITIAL ENCOUNTER: ICD-10-CM

## 2024-07-23 DIAGNOSIS — M25.571 RIGHT ANKLE PAIN: ICD-10-CM

## 2024-07-23 PROCEDURE — 97110 THERAPEUTIC EXERCISES: CPT | Mod: GP

## 2024-07-23 NOTE — PROGRESS NOTES
Physical Therapy Treatment    Patient Name:Bon Mendoza  MRN:[id number]  Today's Date:7/23/2024  Referred by: Doreen Miller  Time Calculation  Start Time: 1439  Stop Time: 1524  Time Calculation (min): 45 min    Therapy Diagnosis   Problem List Items Addressed This Visit             ICD-10-CM    Traumatic closed nondisplaced fracture of distal fibula, right, initial encounter S82.831A    Right ankle pain M25.571    Edema of right ankle M25.471    Antalgic gait R26.89     Other Visit Diagnoses         Codes    Closed avulsion fracture of medial malleolus, right, initial encounter     S82.51XA            Assessment: Patient somewhat fatigued but no c/o pain after treatment - able to progress sport activities    Plan: Probable discharge to independent [facility] after 2 additional visits     Insurance  Visit number: 12  Approved number of visits: MN  Onset Date: 2/4/2024  Payor: [employer] / Plan: [employer] HEALTH PLAN / Product Type: *No Product type* /      Precautions/Fall Risk: weight bearing status: non weight bearing for 2 more weeks until he sees the MD and will possibly progress weight bearing at that visit Pacemaker no  Seizures No  Post Op Movement/Restrictions Yes NWB left LE   MD note 5/6/24: can progress WBAT in boot with 2 crutches, then can wean crutches as able and then boot as strength and balance improve, hip and core and lower leg and intrinsic strengthening.,can do recumbent bike now without boot on, can work up to pool exercises once more comfortable WB     Subjective/Pain:  Current Pain Level (0-10): 0 ankle pain - Having an issue with a toe nail on his right foot causing some pain    HEP Compliance: Good    Objective/Outcome Measures: Patient able to use elliptical x4 minutes at level 1    Treatment  Therapeutic Procedure PT Therapeutic Procedures Time Entry  Therapeutic Exercise Time Entry: 45 minutes  Upright bike by TM level 1 x5 minutes  Slant board calf stretch x1 min  Step hamstring stretch x1  min  Rotary hip ext right 3 pl 2x10 reps  Rotary hip abd right 2 pl 2x10 reps  Red SB wall mini squats 2x10 reps   Shuttle leg press level 5 stop 6 1 blue and 1 yellow cord right only 2x10 reps   Shuttle leg press mini hops level 3 with 0 cords 2x10 reps   Right SLS 4# tramp toss x20 reps standing on blue foam with foot down between each rep, 4x5 reps in a row  Square balance board x30 sec right only without UE assist fwd/back and med/lat  Upside down BOSU right stance left march 2x10 reps  BOSU march x5 reps bilateral  Right SLS x40 sec   Karioke 2x18 ft   Side shuffle 2x18 ft   Back shuffle 2x18 ft   Prostretch x1 min right   Heel raise bilateral and eccentric lower right x1 min  Ladder drills               lateral shuffle down and back x2 reps     Lateral diagonal down and back  Standing on lateral side in in out out x2 reps  Mini trampoline bounce x1 min, march x1 min, mini jump x1 min  Basketball drills: fwd dribble/back dribble, lateral dribble with rotation, dribble between legs  Elliptical level 1 x4 min

## 2024-07-30 ENCOUNTER — APPOINTMENT (OUTPATIENT)
Dept: PHYSICAL THERAPY | Facility: HOSPITAL | Age: 24
End: 2024-07-30
Payer: COMMERCIAL

## 2024-08-02 ENCOUNTER — TREATMENT (OUTPATIENT)
Dept: PHYSICAL THERAPY | Facility: HOSPITAL | Age: 24
End: 2024-08-02
Payer: COMMERCIAL

## 2024-08-02 DIAGNOSIS — S82.51XA CLOSED AVULSION FRACTURE OF MEDIAL MALLEOLUS, RIGHT, INITIAL ENCOUNTER: ICD-10-CM

## 2024-08-02 DIAGNOSIS — S82.831A TRAUMATIC CLOSED NONDISPLACED FRACTURE OF DISTAL FIBULA, RIGHT, INITIAL ENCOUNTER: ICD-10-CM

## 2024-08-02 DIAGNOSIS — M25.471 EDEMA OF RIGHT ANKLE: ICD-10-CM

## 2024-08-02 DIAGNOSIS — M25.571 RIGHT ANKLE PAIN: ICD-10-CM

## 2024-08-02 DIAGNOSIS — R26.89 ANTALGIC GAIT: ICD-10-CM

## 2024-08-02 PROCEDURE — 97110 THERAPEUTIC EXERCISES: CPT | Mod: GP

## 2024-08-02 NOTE — PROGRESS NOTES
Physical Therapy Treatment    Patient Name:Bon Mendoza  MRN:[id number]  Today's Date:8/2/2024  Referred by: Doreen Miller  Time Calculation  Start Time: 0958  Stop Time: 1050  Time Calculation (min): 52 min    Therapy Diagnosis   Problem List Items Addressed This Visit             ICD-10-CM    Traumatic closed nondisplaced fracture of distal fibula, right, initial encounter S82.831A    Right ankle pain M25.571    Edema of right ankle M25.471    Antalgic gait R26.89     Other Visit Diagnoses         Codes    Closed avulsion fracture of medial malleolus, right, initial encounter     S82.51XA            Assessment: Patient has progressed very well in therapy and is ready for discharge to independent [facility].    Plan: Discharge patient to independent HEP    Insurance  Visit number: 13  AApproved number of visits: MN  Onset Date: 2/4/2024  Payor: [employer] / Plan: [employer] HEALTH PLAN / Product Type: *No Product type* /      Precautions/Fall Risk: weight bearing status: non weight bearing for 2 more weeks until he sees the MD and will possibly progress weight bearing at that visit Pacemaker no  Seizures No  Post Op Movement/Restrictions Yes NWB left LE   MD note 5/6/24: can progress WBAT in boot with 2 crutches, then can wean crutches as able and then boot as strength and balance improve, hip and core and lower leg and intrinsic strengthening.,can do recumbent bike now without boot on, can work up to pool exercises once more comfortable WB     Subjective/Pain:  Current Pain Level (0-10): 0 His toe nail started to feel better but then got sore again.  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 He is pleased with his progress.    HEP Compliance: Good    Objective/Outcome Measures: Initial // 6/25/24 // Current  Lower Extremity Funtional Score (LEFS): 36 // 71 //  76  R/L ankle PF AROM (degrees): 40 // 55 // 55  R/L ankle DF AROM (degrees): neutral // 10 // 15  R/L ankle INV AROM  (degrees): 5 // 20 // 30  R/L ankle EV AROM (degrees): 5 // 10 // 15  RR/L ankle PF strength (MMT): NT // 4+/5 // 5/5  R/L ankle DF strength (MMT): NT // 5/5 // 5/5  R/L ankle INV strength (MMT): NT // 5/5  // 5/5  R/L ankle EV strength (MMT): NT // 5/5 // 5/5  Gait: NWB, crutches // no assistive device, no limp //no gait deviations   Patient able to perform right SLS x40 sec without UE assist    Treatment  Therapeutic Procedure PT Therapeutic Procedures Time Entry  Therapeutic Exercise Time Entry: 50 minutes  Upright bike by TM level 1 x5 minutes  Slant board calf stretch x1 min  Step hamstring stretch x1 min  Rotary hip ext right 3 pl 2x10 reps  Rotary hip abd right 2 pl 2x10 reps  Red SB wall mini squats 2x10 reps   Shuttle leg press level 5 stop 6 1 blue and 1 yellow cord right only 2x10 reps   Shuttle leg press mini hops level 3 with 0 cords 2x10 reps   Right SLS 4# tramp toss x20 reps standing on blue foam 4x5 reps in a row  Square balance board x30 sec right only without UE assist fwd/back and med/lat  Upside down BOSU right stance left march 2x10 reps  BOSU march x5 reps bilateral  Right SLS x30 sec   Karioke 2x18 ft   Side shuffle 2x18 ft   Back shuffle 2x18 ft   Prostretch x1 min right   Mini trampoline bounce x1 min, march x1 min, mini jump x1 min  Basketball drills: fwd dribble/back dribble, lateral dribble with rotation, dribble between legs  Elliptical level 1 x4 min    Goals:  Demonstrate independence with home exercise program Met  Tolerate increased exercise without adverse reaction Met  Increase ROM of right ankle PF to 45 deg and DF to 5 deg to improve the ability to perform essential ADLs Met  Increase strength of right ankle PF/DF to 4+/5 MMT to improve the ability to perform essential ADLs Met  Report decrease in pain by >= 2 points to meet MCID Met  Increase score of LEFS by > 9 points to meet the MCID Met  Ambulate during therapy session without an increase in symptoms Met  Patient stated  goal: rehab the ankle for active use Met        Patient presenting with HTN otherwise asymptomatic. 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 Obtain blood work, X-ray, r/u organ damage if no end organ damage will give PMD f/u.

## 2024-08-05 ENCOUNTER — OFFICE VISIT (OUTPATIENT)
Dept: ORTHOPEDIC SURGERY | Facility: HOSPITAL | Age: 24
End: 2024-08-05
Payer: COMMERCIAL

## 2024-08-05 ENCOUNTER — HOSPITAL ENCOUNTER (OUTPATIENT)
Dept: RADIOLOGY | Facility: HOSPITAL | Age: 24
Discharge: HOME | End: 2024-08-05
Payer: COMMERCIAL

## 2024-08-05 DIAGNOSIS — S82.831A TRAUMATIC CLOSED NONDISPLACED FRACTURE OF DISTAL FIBULA, RIGHT, INITIAL ENCOUNTER: ICD-10-CM

## 2024-08-05 DIAGNOSIS — S93.491A SYNDESMOTIC ANKLE SPRAIN, RIGHT, INITIAL ENCOUNTER: ICD-10-CM

## 2024-08-05 PROCEDURE — 1036F TOBACCO NON-USER: CPT | Performed by: ORTHOPAEDIC SURGERY

## 2024-08-05 PROCEDURE — 73610 X-RAY EXAM OF ANKLE: CPT | Mod: RT

## 2024-08-05 PROCEDURE — 99213 OFFICE O/P EST LOW 20 MIN: CPT | Performed by: ORTHOPAEDIC SURGERY

## 2024-08-05 PROCEDURE — 73610 X-RAY EXAM OF ANKLE: CPT | Mod: RIGHT SIDE | Performed by: RADIOLOGY

## 2024-08-05 ASSESSMENT — PAIN - FUNCTIONAL ASSESSMENT: PAIN_FUNCTIONAL_ASSESSMENT: NO/DENIES PAIN

## 2024-08-05 NOTE — PROGRESS NOTES
This is a pleasant 24 y.o. year old male who presents for fuv of  right ankle.  Finished PT (graduated) and given HEP .  Feeling good wrt ankle and feels ready to return to skating and hockey.    PHYSICAL EXAMINATION  Constitutional Exam: patient's height and weight reviewed, well-kempt  Psychiatric Exam: alert and oriented x 3, appropriate mood and behavior  Eye Exam: RAJIV, EOMI  Pulmonary Exam: breathing non-labored, no apparent distress  Lymphatic exam: no appreciable lymphadenopathy in the lower extremities  Cardiovascular exam: DP pulses 2+ bilaterally, PT 2+ bilaterally, toes are pink with good capillary refill, no pitting edema  Skin exam: no open lesions, rashes, abrasions or ulcerations  Neurological exam: sensation to light touch intact in both lower extremities in peripheral and dermatomal distributions (except for any abnormalities noted in musculoskeletal exam)    Musculoskeletal exam: right ankle: no pain to palpation over distal fibula, no pain over syndesmosis, no ankle effusion, stable ligamentous exam, motor strength 5/5 for DF/PF/INV/EV, able to do single leg squat with good control    DATA/RESULTS REVIEW: I personally reviewed the patient's x-ray images and reports of the  right ankle showing healed ankle fracture with intact hardware and appropriate alignment .     ASSESSMENT: s/p right ankle scope with extensive debridement, ORIF of distal fibula, ORIF syndesmosis 3/19/24  PLAN: He is medically cleared to progress to running program, gave him return to run program handout.  He does not have any significant swelling of his right ankle or foot area and so can return to hockey skates- start skating and condition first before he returns to hockey games.  We will see him back at 2 year point after surgery to screen for ankle arthritis- weightbearing xrays AP/lat/obl at that time of right ankle.  The patient's questions were answered in detail.      Note dictated with Clik[employer]  software, completed without full type editing to avoid delay.

## (undated) DEVICE — DRILL BIT, 2.0MM CALIBRATED

## (undated) DEVICE — DRILL BIT, 2.7 MM

## (undated) DEVICE — BANDAGE, ESMARK, 6 IN X 9 FT, STERILE

## (undated) DEVICE — Device

## (undated) DEVICE — PADDING, WEBRIL, UNDERCAST, STERILE, 6 IN

## (undated) DEVICE — DRILL BIT, 3.5MM

## (undated) DEVICE — PADDING, WEBRIL, UNDERCAST, STERILE, 4 IN

## (undated) DEVICE — GLOVE, SURGICAL, SENSITIVE, GAMMEX, SZ-7, PF, WHITE

## (undated) DEVICE — APPLICATOR, CHLORAPREP, W/ORANGE TINT, 26ML

## (undated) DEVICE — DRILL BIT, 2.5 MM

## (undated) DEVICE — CUFF, TOURNIQUET, 30 X 4, DUAL PORT/SNGL BLADDER, DISP, LF

## (undated) DEVICE — STRAP, ANKLE, DISTRACTOR, GUHL

## (undated) DEVICE — DRAPE, TOWEL, STERI DRAPE, 17 X 11 IN, PLASTIC, STERILE

## (undated) DEVICE — DRILL BIT, CALIBRATED, 2.5MM

## (undated) DEVICE — COVER HANDLE LIGHT, STERIS, BLUE, STERILE

## (undated) DEVICE — DRAPE, SHEET, FAN FOLDED, MEDIUM, 44 X 72 IN, DISPOSABLE, LF, STERILE

## (undated) DEVICE — BLADE, SHAVER, FULL RADIUS, 2.9MM

## (undated) DEVICE — SUTURE, ETHILON, 4-0, 18, PS2, BLACK

## (undated) DEVICE — BANDAGE, ELASTIC, MATRIX, SELF-CLOSURE, 6 IN X 5 YD, LF

## (undated) DEVICE — GLOVE, SURGICAL, PROTEXIS PI ORTHO, 7.0, PF, LF

## (undated) DEVICE — BANDAGE, ELASTIC, MATRIX, SELF-CLOSURE, 4 IN X 5 YD, LF

## (undated) DEVICE — PREP TRAY, VAGINAL

## (undated) DEVICE — BURR, SHAVER, ABRADER 2.9MM

## (undated) DEVICE — TUBING, OUTFLOW, DRAIN PIPE, W/ONE WAY VALVE (FMS VUE)

## (undated) DEVICE — DRESSING, ABDOMINAL, TENDERSORB, 8 X 10 IN, STERILE

## (undated) DEVICE — BB-TAK, THREADED

## (undated) DEVICE — DRAPE, SHEET, U, STERI DRAPE, 47 X 51 IN, DISPOSABLE, STERILE

## (undated) DEVICE — TUBING, SUCTION, CONNECTING, STERILE 0.25 X 120 IN., LF

## (undated) DEVICE — TUBING, NFLOW, FMS VUE, SNGL USE, DISP, LF